# Patient Record
Sex: MALE | Race: WHITE | NOT HISPANIC OR LATINO | Employment: FULL TIME | URBAN - METROPOLITAN AREA
[De-identification: names, ages, dates, MRNs, and addresses within clinical notes are randomized per-mention and may not be internally consistent; named-entity substitution may affect disease eponyms.]

---

## 2017-03-16 ENCOUNTER — ALLSCRIPTS OFFICE VISIT (OUTPATIENT)
Dept: OTHER | Facility: OTHER | Age: 42
End: 2017-03-16

## 2017-08-05 ENCOUNTER — OFFICE VISIT (OUTPATIENT)
Dept: URGENT CARE | Facility: MEDICAL CENTER | Age: 42
End: 2017-08-05
Payer: COMMERCIAL

## 2017-08-05 PROCEDURE — 99213 OFFICE O/P EST LOW 20 MIN: CPT

## 2017-08-06 ENCOUNTER — OFFICE VISIT (OUTPATIENT)
Dept: URGENT CARE | Facility: MEDICAL CENTER | Age: 42
End: 2017-08-06
Payer: COMMERCIAL

## 2017-08-06 PROCEDURE — 99213 OFFICE O/P EST LOW 20 MIN: CPT

## 2018-01-10 ENCOUNTER — ALLSCRIPTS OFFICE VISIT (OUTPATIENT)
Dept: OTHER | Facility: OTHER | Age: 43
End: 2018-01-10
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
## 2018-01-13 VITALS
SYSTOLIC BLOOD PRESSURE: 110 MMHG | BODY MASS INDEX: 29.55 KG/M2 | HEART RATE: 74 BPM | DIASTOLIC BLOOD PRESSURE: 74 MMHG | HEIGHT: 73 IN | WEIGHT: 223 LBS

## 2018-01-13 NOTE — PSYCH
Psych Med Mgmt    Appearance: was calm and cooperative, adequate hygiene and grooming and good eye contact  Observed mood: mood appropriate  Observed mood: affect appropriate  Speech: a normal rate  Thought processes: coherent/organized  Hallucinations: no hallucinations present  Thought Content: no delusions  Abnormal Thoughts: The patient has no suicidal thoughts and no homicidal thoughts  Orientation: The patient is oriented to person, place and time  Recent and Remote Memory: short term memory intact and long term memory intact  Attention Span And Concentration: concentration intact  Insight: Insight intact  Judgment: His judgment was intact  Muscle Strength And Tone  Muscle strength and tone were normal  Normal gait and station  Language: no difficulty naming common objects  Fund of knowledge: Patient displays adequate knowledge of current events, adequate fund of knowledge regarding past history and adequate fund of knowledge regarding vocabulary  The patient is experiencing no localized pain  Treatment Recommendations: Continue Lamictal 400 mg at bedtime  Off Adderall XR  Risks, Benefits And Possible Side Effects Of Medications: Risks, benefits, and possible side effects of medications explained to patient and patient verbalizes understanding  He reports normal appetite, normal energy level, recent 5 lbs weight loss and normal number of sleep hours  Patient states he has been doing well since last visit  Mood has been stable, no significant depression  Anxiety is controlled  No suicidality or homicidality  No psychotic symptoms  No side effects from medications reported  No rash  Vitals  Signs [Data Includes: Current Encounter]   Recorded: 04Zvz8412 03:36PM   Heart Rate: 73  Systolic: 653  Diastolic: 77  BP Cuff Size: Large  Height: 6 ft 1 in  Weight: 235 lb   BMI Calculated: 31  BSA Calculated: 2 3    Assessment    1   Bipolar I disorder, most recent episode mixed, in full remission (296 66) (F31 78)   2  ADHD (attention deficit hyperactivity disorder), combined type (314 01) (F90 2)    Plan    1  Follow-up visit in 6 months Evaluation and Treatment  Follow-up  Status: Hold For -   Scheduling  Requested for: 14Apr2016    2  LamoTRIgine 200 MG Oral Tablet (LaMICtal); TAKE 2 TABLETS AT BEDTIME    Review of Systems    Constitutional: recent 5 lb weight loss  Cardiovascular: no complaints of slow or fast heart rate, no chest pain, no palpitations  Respiratory: no complaints of shortness of breath, no wheezing, no dyspnea on exertion  Gastrointestinal: no complaints of abdominal pain, no constipation, no nausea, no diarrhea, no vomiting  Genitourinary: no complaints of dysuria, no incontinence, no pelvic pain, no urinary frequency  Musculoskeletal: no complaints of arthralgia, no myalgias, no limb pain, no joint stiffness  Integumentary: no complaints of skin rash, no itching, no dry skin  Neurological: no complaints of headache, no confusion, no numbness, no dizziness  Past Psychiatric History    Past Psychiatric History: No history of past inpatient psychiatric admissions  No history of past suicide attempts  Substance Abuse Hx    Substance Abuse History: History of cocaine abuse years ago  Active Problems    1  ADHD (attention deficit hyperactivity disorder), combined type (314 01) (F90 2)   2  Bipolar I disorder, most recent episode mixed, in full remission (296 66) (F31 78)   3  Heart attack (410 90) (I21 3)   4  Hypertension (401 9) (I10)    Past Medical History    1  Denied: History of head injury   2  Denied: History of Seizures    The active problems and past medical history were reviewed and updated today  Allergies    1  No Known Drug Allergies    Current Meds   1  Amoxicillin-Pot Clavulanate 875-125 MG Oral Tablet; Therapy: 61ZYL4142 to (Last Kendra Engel)  Requested for: 48PBX3011 Ordered   2   Azithromycin 250 MG Oral Tablet; Therapy: 19XWV2402 to (Last Rx:29Oct2011)  Requested for: 29Oct2011 Ordered   3  Dexilant 60 MG Oral Capsule Delayed Release; Therapy: 40OYT7118 to (Last Rx:16Mar2011)  Requested for: 04QUG5841 Ordered   4  Diphenoxylate-Atropine 2 5-0 025 MG Oral Tablet; Therapy: 98TGP9405 to (Last Rx:01Jun2011)  Requested for: 26NIM8762 Ordered   5  Fluticasone Propionate 50 MCG/ACT Nasal Suspension; Therapy: 93KBJ1195 to (Last Rx:29Oct2011)  Requested for: 29Oct2011 Ordered   6  Ibuprofen 800 MG Oral Tablet; Therapy: 47SLC5358 to (Last Rx:69Cry3068)  Requested for: 32Omt5571 Ordered   7  LamoTRIgine 200 MG Oral Tablet; TAKE 2 TABLETS AT BEDTIME; Therapy: 96IAE4378 to (Evaluate:26Fmj3181)  Requested for: 62UZA3025; Last   Rx:14Oct2015 Ordered   8  LamoTRIgine 200 MG Oral Tablet; Therapy: 85UDL6190 to (Last Rx:04Oct2010)  Requested for: 18SRS5910; Status: ACTIVE   - Renewal Denied Ordered   9  LORazepam 1 MG Oral Tablet; Therapy: 58BZK5496 to (Last Rx:07Ltk0655)  Requested for: 44Ylf3135 Ordered   10  Nasonex 50 MCG/ACT Nasal Suspension; Therapy: 56PJF2195 to (Last Kali Sheets)  Requested for: 41GLM8372 Ordered   11  PEG 3350/Electrolytes 240 GM Oral Solution Reconstituted; Therapy: 70QDX1042 to (Last Rx:17Mar2011)  Requested for: 56LJM0884 Ordered   12  Promethazine-Codeine 6 25-10 MG/5ML Oral Syrup; Therapy: 08SBW8976 to (Last Rx:94Pik4427)  Requested for: 29Oct2011 Ordered   13  Strattera 80 MG Oral Capsule; Therapy: 10HDH1556 to (Last Rx:90Zzu5818)  Requested for: 04Oct2010 Ordered    The medication list was reviewed and updated today  Family Psych History    1  Family history of Anxiety    2  No family history of mental disorder    The family history was reviewed and updated today  Social History    · Never smoked   · Social alcohol use (Z78 9)   · History of Uses cocaine  The social history was reviewed and updated today  The social history was reviewed and is unchanged  , lives with wife  Has 3year old daughter  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 bachelor's degree in engineering  No history of legal problems  No  history  History Of Phys/Sex Abuse Or Perpetration    History Of Phys/Sex Abuse or Perpetration: No history of physical or sexual abuse  End of Encounter Meds    1  LamoTRIgine 200 MG Oral Tablet (LaMICtal); TAKE 2 TABLETS AT BEDTIME; Therapy: 18HIX5525 to (Evaluate:09Jan2017)  Requested for: 14Apr2016; Last   Rx:14Apr2016 Ordered    2  Amoxicillin-Pot Clavulanate 875-125 MG Oral Tablet; Therapy: 57SER4592 to (Last Lis Edilia)  Requested for: 78ZOH3626 Ordered   3  Azithromycin 250 MG Oral Tablet; Therapy: 49NQM2732 to (Last Rx:29Oct2011)  Requested for: 29Oct2011 Ordered   4  Dexilant 60 MG Oral Capsule Delayed Release; Therapy: 96GHI4966 to (Last Rx:16Mar2011)  Requested for: 01UPA1650 Ordered   5  Diphenoxylate-Atropine 2 5-0 025 MG Oral Tablet; Therapy: 14JYW2733 to (Last Rx:01Jun2011)  Requested for: 53UJO5423 Ordered   6  Fluticasone Propionate 50 MCG/ACT Nasal Suspension; Therapy: 89JUU0347 to (Last Rx:29Oct2011)  Requested for: 29Oct2011 Ordered   7  Ibuprofen 800 MG Oral Tablet; Therapy: 30CBZ4003 to (Last Rx:48Rta6626)  Requested for: 10Oxc0442 Ordered   8  LamoTRIgine 200 MG Oral Tablet; Therapy: 65VPP0500 to (Last Rx:04Oct2010)  Requested for: 66ZJL5285; Status: ACTIVE   - Renewal Denied Ordered   9  LORazepam 1 MG Oral Tablet; Therapy: 05RWA4735 to (Last Rx:54Jgy5757)  Requested for: 87Rbd4118 Ordered   10  Nasonex 50 MCG/ACT Nasal Suspension (Mometasone Furoate); Therapy: 39UEE1551 to (Last Lis Edilia)  Requested for: 19KWA6535 Ordered   11  PEG 3350/Electrolytes 240 GM Oral Solution Reconstituted; Therapy: 22ALI4352 to (Last Rx:17Mar2011)  Requested for: 77QTS4126 Ordered   12  Promethazine-Codeine 6 25-10 MG/5ML Oral Syrup;     Therapy: 16GXW5463 to (Last Rx:29Oct2011)  Requested for: 11MCE0727 Ordered   13  Strattera 80 MG Oral Capsule;     Therapy: 41ERR1880 to (Last Rx:04Oct2010)  Requested for: 62BAI3001 Ordered    Signatures   Electronically signed by : BISI Werner ; Apr 14 2016  3:41PM EST                       (Author)

## 2018-01-23 VITALS
BODY MASS INDEX: 32.07 KG/M2 | SYSTOLIC BLOOD PRESSURE: 130 MMHG | HEIGHT: 73 IN | WEIGHT: 242 LBS | DIASTOLIC BLOOD PRESSURE: 85 MMHG | HEART RATE: 82 BPM

## 2018-01-23 NOTE — PSYCH
Psych Med Mgmt    Appearance: was calm and cooperative, adequate hygiene and grooming and good eye contact  Observed mood: mood appropriate  Observed mood: affect appropriate  Speech: a normal rate and fluent  Thought processes: coherent/organized  Hallucinations: no hallucinations present  Thought Content: no delusions  Abnormal Thoughts: The patient has no suicidal thoughts and no homicidal thoughts  Orientation: The patient is oriented to person, place and time  Recent and Remote Memory: short term memory intact and long term memory intact  Attention Span And Concentration: concentration impaired  Insight: Insight intact  Judgment: His judgment was intact  Muscle Strength And Tone  Muscle strength and tone were normal  Normal gait and station  Language: no difficulty naming common objects, no difficulty repeating a phrase and no difficulty writing a sentence  Fund of knowledge: Patient displays adequate knowledge of current events, adequate fund of knowledge regarding past history and adequate fund of knowledge regarding vocabulary  The patient is experiencing moderate to severe pain  On a scale of 0 - 10 the pain severity is a 3  Treatment Recommendations: Continue Lamictal 400 mg at bedtime  Continue Klonopin 0 5 mg bid PRN for anxiety    Risks, Benefits And Possible Side Effects Of Medications: Risks, benefits, and possible side effects of medications explained to patient and patient verbalizes understanding  Discussed with patient the risks of sedation, respiratory depression, impairment of ability to drive and potential for abuse and addiction related to treatment with benzodiazepine medications  The patient understands risk of treatment with benzodiazepine medications, agrees to not drive if feels impaired and agrees to take medications as prescribed          The patient has been filling controlled prescriptions on time as prescribed to [place] [facility]       He reports normal appetite, normal energy level, recent 12 lbs weight gain  and normal number of sleep hours  Not seen since March 2017  Dengerson Garcias states he has been doing well since last visit  Mood has been stable, no significant depressive symptoms  Still has occasional anxiety symptoms - feels Klonopin helps with that (takes it few times a week)  No suicidal or homicidal ideation  No psychotic symptoms  Sleep is adequate  Appetite is good  No side effects from medications reported  No rash  Vitals  Signs   Recorded: 12RGG7586 04:23PM   Heart Rate: 82  Systolic: 898  Diastolic: 85  BP Cuff Size: Large  Height: 6 ft 1 in  Weight: 242 lb   BMI Calculated: 31 93  BSA Calculated: 2 33    Assessment    1  Bipolar I disorder, most recent episode depressed, in full remission (296 56) (F31 76)   2  CODEY (generalized anxiety disorder) (300 02) (F41 1)   3  ADHD (attention deficit hyperactivity disorder), combined type (314 01) (F90 2)    Plan    1  LamoTRIgine 200 MG Oral Tablet; TAKE 2 TABLETS AT BEDTIME    2  ClonazePAM 0 5 MG Oral Tablet; TAKE 1 TABLET TWICE DAILY AS NEEDED    Review of Systems    Constitutional: recent 12 lb weight gain  Cardiovascular: no complaints of slow or fast heart rate, no chest pain, no palpitations  Respiratory: no complaints of shortness of breath, no wheezing, no dyspnea on exertion  Gastrointestinal: no complaints of abdominal pain, no constipation, no nausea, no diarrhea, no vomiting  Genitourinary: no complaints of dysuria, no incontinence, no pelvic pain, no urinary frequency  Musculoskeletal: lower back pain  Integumentary: no complaints of skin rash, no itching, no dry skin  Neurological: no complaints of headache, no confusion, no numbness, no dizziness  Past Psychiatric History    Past Psychiatric History: No history of past inpatient psychiatric admissions  No history of past suicide attempts          Substance Abuse Hx    Substance Abuse History: History of cocaine abuse years ago  Active Problems    1  ADHD (attention deficit hyperactivity disorder), combined type (314 01) (F90 2)   2  Heart attack (410 90) (I21 9)   3  Hypertension (401 9) (I10)    Past Medical History    1  Denied: History of head injury   2  Denied: History of Seizures    The active problems and past medical history were reviewed and updated today  Allergies    1  Ancef SOLR   2  CefTRIAXone Sodium POWD    Current Meds   1  AmLODIPine Besylate 5 MG Oral Tablet; TAKE 1 TABLET DAILY; Therapy: 42OHY5752 to Recorded   2  Aspirin 81 MG Oral Tablet Delayed Release; TAKE 1 TABLET DAILY; Therapy: 53MMO1596 to Recorded   3  Atorvastatin Calcium 10 MG Oral Tablet; Therapy: (Ephraim Busch) to Recorded   4  ClonazePAM 0 5 MG Oral Tablet; TAKE 1 TABLET TWICE DAILY AS NEEDED; Therapy: 08HQY2574 to (Evaluate:09Jan2018); Last Rx:10Nov2017 Ordered   5  CoQ-10 100 MG Oral Capsule; TAKE 1 CAPSULE TWICE DAILY; Therapy: 47EYW2842 to Recorded   6  Effient 10 MG Oral Tablet; TAKE 1 TABLET DAILY; Therapy: 43HRB2022 to Recorded   7  LamoTRIgine 200 MG Oral Tablet; TAKE 2 TABLETS AT BEDTIME; Therapy: 10FVS0375 to (Evaluate:93Uvp1188)  Requested for: 24XQM2350; Last   Rx:14Nov2017 Ordered   8  Metoprolol Succinate ER 50 MG Oral Tablet Extended Release 24 Hour; TAKE 1 AND 1/2   TABLETS DAILY; Therapy: 32COF2322 to Recorded   9  Spironolactone 25 MG Oral Tablet; TAKE 1 TABLET DAILY; Therapy: 01ICH4010 to Recorded    The medication list was reviewed and updated today  Family Psych History  Mother    1  Family history of Anxiety  Father    2  No family history of mental disorder    The family history was reviewed and updated today  Social History    · Never smoked   · Social alcohol use (Z78 9)   · History of Uses cocaine  The social history was reviewed and updated today  , lives with wife  Has 11year old daughter   Works for [employer] in [email or web address]  Has bachelor's degree in engineering  No history of legal problems  No  history  History Of Phys/Sex Abuse Or Perpetration    History Of Phys/Sex Abuse or Perpetration: No history of physical or sexual abuse  End of Encounter Meds    1  LamoTRIgine 200 MG Oral Tablet; TAKE 2 TABLETS AT BEDTIME; Therapy: 69ACF7436 to (Heath Nam)  Requested for: 43KUW0563; Last   Rx:10Jan2018 Ordered    2  ClonazePAM 0 5 MG Oral Tablet; TAKE 1 TABLET TWICE DAILY AS NEEDED; Therapy: 97BPI1724 to (Evaluate:09Jun2018); Last Rx:10Jan2018 Ordered    3  AmLODIPine Besylate 5 MG Oral Tablet; TAKE 1 TABLET DAILY; Therapy: 94PVI0145 to Recorded   4  Aspirin 81 MG Oral Tablet Delayed Release; TAKE 1 TABLET DAILY; Therapy: 48SSJ9075 to Recorded   5  Atorvastatin Calcium 10 MG Oral Tablet; Therapy: (Cloduglas Rodolfo) to Recorded   6  CoQ-10 100 MG Oral Capsule; TAKE 1 CAPSULE TWICE DAILY; Therapy: 27CTL4918 to Recorded   7  Effient 10 MG Oral Tablet (Prasugrel HCl); TAKE 1 TABLET DAILY; Therapy: 69CZP2939 to Recorded   8  Metoprolol Succinate ER 50 MG Oral Tablet Extended Release 24 Hour; TAKE 1 AND 1/2   TABLETS DAILY; Therapy: 28DRI3148 to Recorded   9  Spironolactone 25 MG Oral Tablet; TAKE 1 TABLET DAILY;    Therapy: 93YEH3965 to Recorded    Signatures   Electronically signed by : BISI Kaba ; Wilmar 10 2018  4:41PM EST                       (Author)

## 2018-05-14 PROBLEM — F41.1 GAD (GENERALIZED ANXIETY DISORDER): Status: ACTIVE | Noted: 2018-01-10

## 2018-05-14 PROBLEM — F31.76 BIPOLAR I DISORDER, MOST RECENT EPISODE DEPRESSED, IN FULL REMISSION (HCC): Chronic | Status: ACTIVE | Noted: 2018-01-10

## 2018-05-14 PROBLEM — F41.1 GAD (GENERALIZED ANXIETY DISORDER): Chronic | Status: ACTIVE | Noted: 2018-01-10

## 2018-05-14 PROBLEM — F31.76 BIPOLAR I DISORDER, MOST RECENT EPISODE DEPRESSED, IN FULL REMISSION (HCC): Status: ACTIVE | Noted: 2018-01-10

## 2018-05-14 PROBLEM — I25.10 CHRONIC CORONARY ARTERY DISEASE: Status: ACTIVE | Noted: 2018-05-14

## 2018-05-14 RX ORDER — LAMOTRIGINE 200 MG/1
2 TABLET ORAL
COMMUNITY
Start: 2017-03-16 | End: 2018-05-17 | Stop reason: SDUPTHER

## 2018-05-14 RX ORDER — CLONAZEPAM 0.5 MG/1
1 TABLET ORAL 2 TIMES DAILY PRN
COMMUNITY
Start: 2017-03-16 | End: 2018-05-17 | Stop reason: SDUPTHER

## 2018-05-14 NOTE — PSYCH
MEDICATION MANAGEMENT NOTE        [address]      Name and Date of Birth:  Ramesh Cummings 36 y o  1975 MRN: [id number]    Date of Visit: May 17, 2018    SUBJECTIVE:    La Patel continues to do well since the last visit  He reports that mood has been stable, denies any significant depressive symptoms  He states that anxiety symptoms are more controlled, has been taking Klonopin only occasionally  He denies any suicidal ideation, intent or plan at present; denies any homicidal ideation, intent or plan at present  He has no auditory hallucinations, denies any visual hallucinations, no overt delusions noted  He denies any side effects from current psychiatric medications  No rash noted or reported  Dorathy Infield HPI ROS Appetite Changes and Sleep: normal sleep, normal appetite, recent weight gain (5 lbs), normal energy level    Review Of Systems:      Constitutional negative   ENT negative   Cardiovascular negative   Respiratory negative   Gastrointestinal negative   Genitourinary negative   Musculoskeletal negative   Integumentary negative   Neurological negative   Endocrine negative   Other Symptoms none       Past Psychiatric History:     Past Inpatient Psychiatric Treatment:   No history of past inpatient psychiatric admissions  Past Outpatient Psychiatric Treatment:    In outpatient treatment at [address] [place] [address] for many years    Past Suicide Attempts: no  Past Violent Behavior: no  Past Psychiatric Medication Trials: Lamictal, Klonopin, Strattera and Adderall XR    Traumatic History:     Abuse: no history of sexual abuse, no history of physical abuse  Other Traumatic Events: none     Past Medical History:    Past Medical History:   Diagnosis Date    Bacterial infection of knee joint ([address] )     CAD (coronary artery disease)     Heart attack ([facility] [address] )     Hyperlipidemia     Hypertension      Past Medical History Pertinent Negatives:   Diagnosis Date Noted    Head injury     Seizures ([facility] [address] )      Allergies   Allergen Reactions    Atorvastatin      Tolerates crestor    Cefazolin Rash    Ceftriaxone Rash       Substance Abuse History:    History   Alcohol Use    Yes     Comment: social alcohol use     History   Drug Use No     Comment: History of cocaine use years ago  No current recreational drug use       Social History:    Social History     Social History    Marital status: /Civil Union     Spouse name: N/A    Number of children: 1    Years of education: bachelor's degree     Occupational History    works in sales      Social History Main Topics    Smoking status: Never Smoker    Smokeless tobacco: Never Used    Alcohol use Yes      Comment: social alcohol use    Drug use: No      Comment: History of cocaine use years ago  No current recreational drug use    Sexual activity: Yes     Other Topics Concern    Not on file     Social History Narrative    Education: bachelor's degree in engineering    Learning Disabilities: ADHD history    Marital History:     Children: 1 daughter    Living Arrangement: lives in home with wife and daughter    Occupational History: works in sales for [employer] Relationships: good support system    Legal History: none     History: None       Family Psychiatric History:     Family History   Problem Relation Age of Onset    Anxiety disorder Mother        History Review:  The following portions of the patient's history were reviewed and updated as appropriate: allergies, current medications, past family history, past medical history, past social history, past surgical history and problem list          OBJECTIVE:     Vital signs in last 24 hours:    Vitals:    05/17/18 1608   BP: 121/78   Cuff Size: Large   Pulse: 70   Weight: 112 kg (247 lb)   Height: 6' 1" (1 854 m)       Mental Status Evaluation:    Appearance age appropriate, casually dressed   Behavior cooperative, calm   Speech normal rate, normal volume, normal pitch   Mood euthymic   Affect normal range and intensity, appropriate   Thought Processes organized, goal directed   Associations intact associations   Thought Content no overt delusions   Perceptual Disturbances: no auditory hallucinations, no visual hallucinations   Abnormal Thoughts  Risk Potential Suicidal ideation - None  Homicidal ideation - None  Potential for aggression - No   Orientation oriented to person, place, time/date and situation   Memory recent and remote memory grossly intact   Consciousness alert and awake   Attention Span Concentration Span decreased attention span  decreased concentration   Intellect appears to be of average intelligence   Insight intact   Judgement intact   Muscle Strength and  Gait muscle strength and tone were normal, normal gait , normal balance   Motor activity no abnormal movements   Language no difficulty naming common objects, no difficulty repeating a phrase, no difficulty writing a sentence   Fund of Knowledge adequate knowledge of current events  adequate fund of knowledge regarding past history  adequate fund of knowledge regarding vocabulary    Pain none   Pain Scale 0       Laboratory Results: I have personally reviewed all pertinent laboratory/tests results  Recent Labs (last 2 months):   No visits with results within 2 Month(s) from this visit     Latest known visit with results is:   Conversion Encounter Outpatient on 05/17/2015   Component Date Value    Sodium 05/17/2015 141     Potassium 05/17/2015 3 4*    Chloride 05/17/2015 105     CO2 05/17/2015 27     Anion Gap 05/17/2015 9     Glucose 05/17/2015 93     BUN 05/17/2015 21     Calcium 05/17/2015 8 5     Creatinine 05/17/2015 1 14     AAGFR 05/17/2015 >60     NAAGFR 05/17/2015 >60     WBC 05/17/2015 14 98*    RBC 05/17/2015 5 28     Hemoglobin 05/17/2015 15 3     Hematocrit 05/17/2015 44 5     MCV 05/17/2015 84     MCH 05/17/2015 29 0     [facility] 05/17/2015 34 4     RDW 05/17/2015 13 3     Platelets 33/68/8868 264     Neutrophils Relative 05/17/2015 65     Lymphocytes Relative 05/17/2015 25     Monocytes Relative 05/17/2015 9     Eosinophils Relative 05/17/2015 1     Neutrophils Absolute 05/17/2015 9 74*    Lymphocytes Absolute 05/17/2015 3 75     Monocytes Absolute 05/17/2015 1 35*    Eosinophils Absolute 05/17/2015 0 15     Basophils Absolute 05/17/2015 0 04     MPV 05/17/2015 9 5        Assessment/Plan:       Diagnoses and all orders for this visit:    Bipolar I disorder, most recent episode depressed, in full remission (HCC)  -     lamoTRIgine (LaMICtal) 200 MG tablet; Take 2 tablets (400 mg total) by mouth daily at bedtime for 270 days    CODEY (generalized anxiety disorder)  -     clonazePAM (KlonoPIN) 0 5 mg tablet; Take 1 tablet (0 5 mg total) by mouth 2 (two) times a day as needed for anxiety for up to 180 days To be filled on or after 6/9/18    ADHD (attention deficit hyperactivity disorder), combined type    Other orders  -     Discontinue: clonazePAM (KlonoPIN) 0 5 mg tablet; Take 1 tablet by mouth 2 (two) times a day as needed  -     Discontinue: lamoTRIgine (LaMICtal) 200 MG tablet; Take 2 tablets by mouth daily at bedtime  -     aspirin 81 MG tablet; Take 1 tablet by mouth daily  -     Coenzyme Q10 (COQ-10) 100 MG CAPS; Take 1 capsule by mouth 2 (two) times a day  -     prasugrel (EFFIENT) tablet; Take 1 tablet by mouth daily  -     metoprolol succinate (TOPROL-XL) 50 mg 24 hr tablet; Take 1 5 tablets by mouth daily  -     lisinopril (ZESTRIL) 5 mg tablet; Take 5 mg by mouth  -     nitroglycerin (NITROSTAT) 0 4 mg SL tablet; Place 0 4 mg under the tongue  -     rosuvastatin (CRESTOR) 10 MG tablet; Take 10 mg by mouth  -     traMADol (ULTRAM) 50 mg tablet;  Take 50 mg by mouth every 8 (eight) hours  -     TURMERIC PO; Take by mouth        Treatment Recommendations/Precautions:    Continue Lamictal 400 mg at bedtime to help with mood stabilization  Continue Klonopin 0 4 mg bid PRN to improve anxiety symptoms  Medication management every 4 months    Risks/Benefits      Risks, Benefits And Possible Side Effects Of Medications:    Risks, benefits, and possible side effects of medications explained to Isrrael including risk of rash related to treatment with Lamictal and risks of dependence, sedation and respiratory depression related to treatment with benzodiazepine medications  He verbalizes understanding and agreement for treatment  Controlled Medication Discussion:     Isrrael has been filling controlled prescriptions on time as prescribed according to [facility]    Discussed with Yepiter the risks of sedation, respiratory depression, impairment of ability to drive and potential for abuse and addiction related to treatment with benzodiazepine medications  He understands risk of treatment with benzodiazepine medications, agrees to not drive if feels impaired and agrees to take medications as prescribed  Discussed with Kalli Almazan Box warning on concurrent use of benzodiazepines and opioid medications including sedation, respiratory depression, coma and death  He understands the risk of treatment with benzodiazepines in addition to opioids and wants to continue taking those medications      Psychotherapy Provided:     Individual psychotherapy provided: No

## 2018-05-17 ENCOUNTER — OFFICE VISIT (OUTPATIENT)
Dept: PSYCHIATRY | Facility: CLINIC | Age: 43
End: 2018-05-17
Payer: COMMERCIAL

## 2018-05-17 VITALS
HEIGHT: 73 IN | SYSTOLIC BLOOD PRESSURE: 121 MMHG | WEIGHT: 247 LBS | HEART RATE: 70 BPM | DIASTOLIC BLOOD PRESSURE: 78 MMHG | BODY MASS INDEX: 32.74 KG/M2

## 2018-05-17 DIAGNOSIS — F41.1 GAD (GENERALIZED ANXIETY DISORDER): Chronic | ICD-10-CM

## 2018-05-17 DIAGNOSIS — F31.76 BIPOLAR I DISORDER, MOST RECENT EPISODE DEPRESSED, IN FULL REMISSION (HCC): Primary | Chronic | ICD-10-CM

## 2018-05-17 DIAGNOSIS — F90.2 ADHD (ATTENTION DEFICIT HYPERACTIVITY DISORDER), COMBINED TYPE: Chronic | ICD-10-CM

## 2018-05-17 PROBLEM — E78.2 MIXED HYPERLIPIDEMIA: Status: ACTIVE | Noted: 2017-11-20

## 2018-05-17 PROCEDURE — 99213 OFFICE O/P EST LOW 20 MIN: CPT | Performed by: PSYCHIATRY & NEUROLOGY

## 2018-05-17 RX ORDER — NITROGLYCERIN 0.4 MG/1
0.4 TABLET SUBLINGUAL
COMMUNITY

## 2018-05-17 RX ORDER — PRASUGREL 10 MG/1
1 TABLET, FILM COATED ORAL DAILY
COMMUNITY
Start: 2017-03-16

## 2018-05-17 RX ORDER — CLONAZEPAM 0.5 MG/1
0.5 TABLET ORAL 2 TIMES DAILY PRN
Qty: 180 TABLET | Refills: 1 | Status: SHIPPED | OUTPATIENT
Start: 2018-06-09 | End: 2018-06-29 | Stop reason: SDUPTHER

## 2018-05-17 RX ORDER — ROSUVASTATIN CALCIUM 10 MG/1
10 TABLET, COATED ORAL
COMMUNITY

## 2018-05-17 RX ORDER — METOPROLOL SUCCINATE 50 MG/1
1.5 TABLET, EXTENDED RELEASE ORAL DAILY
COMMUNITY
Start: 2017-03-16

## 2018-05-17 RX ORDER — LAMOTRIGINE 200 MG/1
400 TABLET ORAL
Qty: 180 TABLET | Refills: 2 | Status: SHIPPED | OUTPATIENT
Start: 2018-05-17 | End: 2019-02-12 | Stop reason: SDUPTHER

## 2018-05-17 RX ORDER — TRAMADOL HYDROCHLORIDE 50 MG/1
50 TABLET ORAL EVERY 8 HOURS
COMMUNITY
Start: 2018-03-28 | End: 2019-03-20 | Stop reason: ALTCHOICE

## 2018-05-17 RX ORDER — LISINOPRIL 5 MG/1
5 TABLET ORAL
COMMUNITY

## 2018-05-17 NOTE — PSYCH
TREATMENT PLAN (Medication Management Only)        [facility]    Name/Date of Birth/MRN:  Valerie Chacko 36 y o  1975 MRN: [id number]  Date of Treatment Plan: May 17, 2018  Diagnosis/Diagnoses:   1  Bipolar I disorder, most recent episode depressed, in full remission ([facility] [address] )    2  CODEY (generalized anxiety disorder)    3  ADHD (attention deficit hyperactivity disorder), combined type      Strengths/Personal Resources for Self-Care: "managing stress through work and activities", taking medications as prescribed  Area/Areas of need (in own words): "continue to manage stress and anxiety"  1  Long Term Goal: maintain mood stability  Target Date: 4 months - 9/17/2018  Person/Persons responsible for completion of goal: Trina Lenz  2  Short Term Objective (s) - How will we reach this goal?:   A  Provider new recommended medication/dosage changes and/or continue medication(s): continue current medications as prescribed (Lamictal and Klonopin)  B   N/A   C   N/A  Target Date: 4 months - 9/17/2018  Person/Persons Responsible for Completion of Goal: Trina Lenz   Progress Towards Goals: stable  Treatment Modality: medication management every 4 months  Review due 90 to 120 days from date of this plan: 4 months - 9/17/2018  Expected length of service: maintenance  My Physician/PA/NP and I have developed this plan together and I agree to work on the goals and objectives  I understand the treatment goals that were developed for my treatment    Signature:       Date and time:  Signature of parent/guardian if under age of 15 years: Date and time:  Signature of provider:      Date and time:  Signature of Supervising Physician:    Date and time: 5/17/2018      Dennis Carrera MD

## 2018-06-29 ENCOUNTER — TELEPHONE (OUTPATIENT)
Dept: PSYCHIATRY | Facility: CLINIC | Age: 43
End: 2018-06-29

## 2018-06-29 ENCOUNTER — OFFICE VISIT (OUTPATIENT)
Dept: URGENT CARE | Facility: MEDICAL CENTER | Age: 43
End: 2018-06-29
Payer: COMMERCIAL

## 2018-06-29 VITALS — WEIGHT: 240 LBS | BODY MASS INDEX: 31.66 KG/M2

## 2018-06-29 DIAGNOSIS — F41.1 GAD (GENERALIZED ANXIETY DISORDER): Primary | Chronic | ICD-10-CM

## 2018-06-29 DIAGNOSIS — T26.61XA CHEMICAL BURN DUE TO ACID, CONJUNCTIVA, RIGHT: Primary | ICD-10-CM

## 2018-06-29 DIAGNOSIS — T54.2X1A CHEMICAL BURN DUE TO ACID, CONJUNCTIVA, RIGHT: Primary | ICD-10-CM

## 2018-06-29 PROCEDURE — 99213 OFFICE O/P EST LOW 20 MIN: CPT

## 2018-06-29 RX ORDER — CLONAZEPAM 1 MG/1
0.5 TABLET ORAL 2 TIMES DAILY PRN
Qty: 90 TABLET | Refills: 1 | Status: SHIPPED | OUTPATIENT
Start: 2018-06-29 | End: 2018-10-05 | Stop reason: SDUPTHER

## 2018-06-29 RX ORDER — ERYTHROMYCIN 5 MG/G
0.5 OINTMENT OPHTHALMIC
Qty: 3.5 G | Refills: 0 | Status: SHIPPED | OUTPATIENT
Start: 2018-06-29 | End: 2018-07-06

## 2018-06-29 NOTE — PATIENT INSTRUCTIONS
No corneal abrasion on Fluorescein stain  Use erythromycin ointment as directed  Follow up with Dr Nima Linder if any complications  [phone]  Chemical Eye [place]   WHAT YOU NEED TO KNOW:   A chemical eye burn is an injury to any part of your eye that is exposed to chemicals  DISCHARGE INSTRUCTIONS:   Medicines:   · Antibiotic medicine: This medicine helps prevent infection caused by bacteria  It may be given as an eyedrop or ointment  · Pain medicine: You may be given medicine to take away or decrease pain  Do not wait until the pain is severe before you take your medicine  This medicine may be given as an eyedrop or pill  · Cycloplegic medicine: This medicine dilates your pupil and relaxes your eye muscles to help decrease pain and twitching  · Steroids: This medicine may be given to decrease inflammation  · Take your medicine as directed  Contact your healthcare provider if you think your medicine is not helping or if you have side effects  Tell him of her if you are allergic to any medicine  Keep a list of the medicines, vitamins, and herbs you take  Include the amounts, and when and why you take them  Bring the list or the pill bottles to follow-up visits  Carry your medicine list with you in case of an emergency  Follow up with your healthcare provider or ophthalmologist as directed: You will need to return to have your eye and vision checked  Write down your questions so you remember to ask them during your visits  Eye care: Your healthcare provider or ophthalmologist may give you artificial tears or an eye patch to protect your eye and help it heal    If you get chemicals in your eye:  Rinse your eye immediately  The sooner you begin to rinse your eye, the better your chances of healing  · Rinse your eye with a steady stream of water for at least 30 minutes   Use the cleanest water you can get to quickly  Never use other chemicals to rinse out your eye   Move your eyeball in all directions to make sure that all parts of your eye are rinsed  If possible, continue to rinse out your eye with water until you reach the treatment center  · Remove clothing that may still contain chemicals  Do not take out your contact lenses  · Bring the container to show healthcare providers, if possible  Do not bring the container if the chemical may burn you again  Prevent another chemical eye burn:   · Always wear protective eyewear, such as goggles, that fit closely around your eyes  · Do not touch your eyes when you work with chemicals  · Follow the instructions on the container when you use chemicals that may hurt your eyes  · Make a plan in case you or someone else gets burned  Know where the best water or liquid is located for rinsing your eyes  Check to see if your company has an eye wash station  Contact your healthcare provider or eye specialist if:   · Your eye feels dry  · Your eye is watery  · You have questions or concerns about your condition or care  Return to the emergency department if:   · Your eyesight is blurry or you lose vision  · You have cuts, bumps, or other damage on your eyeball  · Your eye becomes red or cloudy  © 2017 [facility] Information is for End User's use only and may not be sold, redistributed or otherwise used for commercial purposes  All illustrations and images included in CareNotes® are the copyrighted property of [employer]  or [employer]  The above information is an  only  It is not intended as medical advice for individual conditions or treatments  Talk to your doctor, nurse or pharmacist before following any medical regimen to see if it is safe and effective for you

## 2018-06-29 NOTE — TELEPHONE ENCOUNTER
Escripted to [facility] for Klonopin 1 mg to take 1/2 tablet 2 times a day as needed, for 90 days with 1 RF

## 2018-06-29 NOTE — TELEPHONE ENCOUNTER
Pt called to say that when he went to fill Clonazepam 0 5 prescription the pharmacy said the Clonazepam 0 5 was on back order  They were wondering if they could substitute Clonazepam 1 mg and have patient break it in half  Can that be done?
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
## 2018-10-01 NOTE — PSYCH
MEDICATION MANAGEMENT NOTE        [facility]      Name and Date of Birth:  Fitz Espana 39 y o  1975 MRN: [id number]    Date of Visit: October 5, 2018    SUBJECTIVE:    Steven Sites states that he continues to do well since the last visit  He reports that mood remains stable, denies any significant depressive symptoms  He states that anxiety symptoms are controlled well  Has been going to the gym regularly, follows with cardiologist and PCP for medical issues  He denies any suicidal ideation, intent or plan at present; denies any homicidal ideation, intent or plan at present  He has no auditory hallucinations, denies any visual hallucinations, has no overt delusions  He denies any side effects from current psychiatric medications  No rash noted or reported  Leslie Porter HPI ROS Appetite Changes and Sleep: normal sleep, normal appetite, recent weight loss (5 lbs), normal energy level    Review Of Systems:      Constitutional recent weight loss (5 lbs)   ENT negative   Cardiovascular negative   Respiratory negative   Gastrointestinal negative   Genitourinary negative   Musculoskeletal back pain   Integumentary negative   Neurological negative   Endocrine negative   Other Symptoms none       Past Psychiatric History:      Past Inpatient Psychiatric Treatment:   No history of past inpatient psychiatric admissions  Past Outpatient Psychiatric Treatment:    In outpatient treatment at [address] [place] [address] for many years    Past Suicide Attempts: no  Past Violent Behavior: no  Past Psychiatric Medication Trials: Lamictal, Klonopin, Strattera and Adderall XR     Traumatic History:      Abuse: no history of sexual abuse, no history of physical abuse  Other Traumatic Events: none     Past Medical History:    Past Medical History:   Diagnosis Date    Bacterial infection [facility] knee joint [facility] )     CAD (coronary artery disease)     Heart attack ([facility] [address] )     Hyperlipidemia     Hypertension      Past Medical History Pertinent Negatives:   Diagnosis Date Noted    Head injury     Seizures ([address] )      Allergies   Allergen Reactions    Atorvastatin      Tolerates crestor    Cefazolin Rash    Ceftriaxone Rash       Substance Abuse History:    History   Alcohol Use    Yes     Comment: social alcohol use     History   Drug Use No     Comment: History of cocaine use years ago  No current recreational drug use       Social History:    Social History     Social History    Marital status: /Civil Union     Spouse name: N/A    Number of children: 1    Years of education: bachelor's degree     Occupational History    works in sales      Social History Main Topics    Smoking status: Never Smoker    Smokeless tobacco: Never Used    Alcohol use Yes      Comment: social alcohol use    Drug use: No      Comment: History of cocaine use years ago  No current recreational drug use    Sexual activity: Yes     Partners: Female     Other Topics Concern    Not on file     Social History Narrative    Education: bachelor's degree in engineering    Learning Disabilities: ADHD history    Marital History:     Children: 1 daughter    Living Arrangement: lives in home with wife and daughter    Occupational History: works in sales for [employer] Relationships: good support system    Legal History: none     History: None       Family Psychiatric History:     Family History   Problem Relation Age of Onset    Anxiety disorder Mother        History Review:  The following portions of the patient's history were reviewed and updated as appropriate: allergies, current medications, past family history, past medical history, past social history, past surgical history and problem list          OBJECTIVE:     Vital signs in last 24 hours:    Vitals:    10/05/18 1532   BP: 121/78   Pulse: 78   Weight: 110 kg (242 lb)   Height: 6' 1" (1 854 m)       Mental Status Evaluation:    Appearance age appropriate, casually dressed   Behavior cooperative, calm   Speech normal rate, normal volume, normal pitch   Mood euthymic   Affect normal range and intensity, appropriate   Thought Processes organized, goal directed   Associations intact associations   Thought Content no overt delusions   Perceptual Disturbances: no auditory hallucinations, no visual hallucinations   Abnormal Thoughts  Risk Potential Suicidal ideation - None  Homicidal ideation - None  Potential for aggression - No   Orientation oriented to person, place, time/date and situation   Memory recent and remote memory grossly intact   Consciousness alert and awake   Attention Span Concentration Span attention span and concentration appear shorter than expected for age   Intellect appears to be of average intelligence   Insight intact   Judgement intact   Muscle Strength and  Gait muscle strength and tone were normal, normal gait , normal balance   Motor activity no abnormal movements   Language no difficulty naming common objects, no difficulty repeating a phrase, no difficulty writing a sentence   Fund of Knowledge adequate knowledge of current events  adequate fund of knowledge regarding past history  adequate fund of knowledge regarding vocabulary    Pain moderate   Pain Scale 5       Laboratory Results: I have personally reviewed all pertinent laboratory/tests results  Recent Labs (last 4 months):   No visits with results within 4 Month(s) from this visit  Latest known visit with results is:   No results found for any previous visit  Assessment/Plan:       Diagnoses and all orders for this visit:    Bipolar I disorder, most recent episode depressed, in full remission ([facility] [address] )    CODEY (generalized anxiety disorder)  -     clonazePAM (KlonoPIN) 1 mg tablet;  Take 0 5 tablets (0 5 mg total) by mouth 2 (two) times a day as needed for anxiety for up to 180 days To be filled on or after 12/26/18    ADHD (attention deficit hyperactivity disorder), combined type        Treatment Recommendations/Precautions:    Continue Lamictal 400 mg at bedtime to help with mood stabilization  Continue Klonopin 0 5 mg bid PRN to improve anxiety symptoms  Medication management every 4 months    Risks/Benefits      Risks, Benefits And Possible Side Effects Of Medications:    Risks, benefits, and possible side effects of medications explained to Isrrael including risk of rash related to treatment with Lamictal and risks of dependence, sedation and respiratory depression related to treatment with benzodiazepine medications  He verbalizes understanding and agreement for treatment  Controlled Medication Discussion:     Isrrael has been filling controlled prescriptions on time as prescribed according to [facility]    Discussed with Isrrael the risks of sedation, respiratory depression, impairment of ability to drive and potential for abuse and addiction related to treatment with benzodiazepine medications  He understands risk of treatment with benzodiazepine medications, agrees to not drive if feels impaired and agrees to take medications as prescribed  Discussed with [employer] warning on concurrent use of benzodiazepines and opioid medications including sedation, respiratory depression, coma and death  He understands the risk of treatment with benzodiazepines in addition to opioids and wants to continue taking those medications  Psychotherapy Provided:     Individual psychotherapy provided: Yes  Counseling was provided during the session today for 20 minutes  Medications, treatment progress and treatment plan reviewed with Isrrael  Goals discussed during in session: maintain control of anxiety and maintain mood stability  Discussed with Isrrael coping with stress at work  Coping strategies including exercising and spending time with family reviewed with Isrrael  Supportive therapy provided  Treatment Plan;    Completed and signed during the session: Yes - with Elvira Quarles MD 10/05/18

## 2018-10-03 ENCOUNTER — DOCUMENTATION (OUTPATIENT)
Dept: PSYCHIATRY | Facility: CLINIC | Age: 43
End: 2018-10-03

## 2018-10-03 NOTE — PROGRESS NOTES
Treatment Plan not completed within required time limits due to :   Follow up appointment scheduled over the 120 days from OV 5/17/2018

## 2018-10-05 ENCOUNTER — OFFICE VISIT (OUTPATIENT)
Dept: PSYCHIATRY | Facility: CLINIC | Age: 43
End: 2018-10-05

## 2018-10-05 VITALS
HEIGHT: 73 IN | WEIGHT: 242 LBS | DIASTOLIC BLOOD PRESSURE: 78 MMHG | SYSTOLIC BLOOD PRESSURE: 121 MMHG | BODY MASS INDEX: 32.07 KG/M2 | HEART RATE: 78 BPM

## 2018-10-05 DIAGNOSIS — F90.2 ADHD (ATTENTION DEFICIT HYPERACTIVITY DISORDER), COMBINED TYPE: Chronic | ICD-10-CM

## 2018-10-05 DIAGNOSIS — F41.1 GAD (GENERALIZED ANXIETY DISORDER): Chronic | ICD-10-CM

## 2018-10-05 DIAGNOSIS — F31.76 BIPOLAR I DISORDER, MOST RECENT EPISODE DEPRESSED, IN FULL REMISSION (HCC): Primary | Chronic | ICD-10-CM

## 2018-10-05 PROCEDURE — 99213 OFFICE O/P EST LOW 20 MIN: CPT | Performed by: PSYCHIATRY & NEUROLOGY

## 2018-10-05 PROCEDURE — 90833 PSYTX W PT W E/M 30 MIN: CPT | Performed by: PSYCHIATRY & NEUROLOGY

## 2018-10-05 RX ORDER — CLONAZEPAM 1 MG/1
0.5 TABLET ORAL 2 TIMES DAILY PRN
Qty: 90 TABLET | Refills: 1 | Status: SHIPPED | OUTPATIENT
Start: 2018-12-26 | End: 2018-10-30 | Stop reason: SDUPTHER

## 2018-10-05 NOTE — PSYCH
TREATMENT PLAN (Medication Management Only)        [facility]    Name/Date of Birth/MRN:  Fitz Espana 39 y o  1975 MRN: [id number]  Date of Treatment Plan: October 5, 2018  Diagnosis/Diagnoses:   1  Bipolar I disorder, most recent episode depressed, in full remission ([facility] [address] )    2  CODEY (generalized anxiety disorder)    3  ADHD (attention deficit hyperactivity disorder), combined type      Strengths/Personal Resources for Self-Care: "family"  Area/Areas of need (in own words): "stress management"  1  Long Term Goal: improve control of anxiety  Target Date: 4 months - 2/5/2019  Person/Persons responsible for completion of goal: [facility]  2  Short Term Objective (s) - How will we reach this goal?:   A  Provider new recommended medication/dosage changes and/or continue medication(s): continue current medications as prescribed (Lamictal and Klonopin)  B   N/A   C   N/A  Target Date: 4 months - 2/5/2019  Person/Persons Responsible for Completion of Goal: [facility]   Progress Towards Goals: stable  Treatment Modality: medication management every 4 months  Review due 90 to 120 days from date of this plan: 4 months - 2/5/2019  Expected length of service: maintenance  My Physician/PA/NP and I have developed this plan together and I agree to work on the goals and objectives  I understand the treatment goals that were developed for my treatment    Signature:       Date and time:  Signature of parent/guardian if under age of 15 years: Date and time:  Signature of provider:      Date and time:  Signature of Supervising Physician:    Date and time: 10/5/2018      Waleska Williamson MD

## 2018-10-30 ENCOUNTER — OFFICE VISIT (OUTPATIENT)
Dept: URGENT CARE | Facility: MEDICAL CENTER | Age: 43
End: 2018-10-30
Payer: COMMERCIAL

## 2018-10-30 VITALS
SYSTOLIC BLOOD PRESSURE: 104 MMHG | DIASTOLIC BLOOD PRESSURE: 80 MMHG | RESPIRATION RATE: 20 BRPM | HEIGHT: 73 IN | OXYGEN SATURATION: 100 % | TEMPERATURE: 97 F | WEIGHT: 250.8 LBS | HEART RATE: 88 BPM | BODY MASS INDEX: 33.24 KG/M2

## 2018-10-30 DIAGNOSIS — S01.512A LACERATION OF TONGUE, INITIAL ENCOUNTER: Primary | ICD-10-CM

## 2018-10-30 PROCEDURE — 99213 OFFICE O/P EST LOW 20 MIN: CPT | Performed by: PHYSICIAN ASSISTANT

## 2018-10-30 RX ORDER — ROSUVASTATIN CALCIUM 20 MG/1
20 TABLET, COATED ORAL EVERY EVENING
Refills: 3 | COMMUNITY
Start: 2018-08-12 | End: 2018-10-30 | Stop reason: SDUPTHER

## 2018-10-30 RX ORDER — CLONAZEPAM 0.5 MG/1
0.5 TABLET, ORALLY DISINTEGRATING ORAL 2 TIMES DAILY PRN
Refills: 4 | COMMUNITY
Start: 2018-08-16 | End: 2019-03-20 | Stop reason: SDUPTHER

## 2018-10-30 RX ORDER — ONDANSETRON 4 MG/1
4 TABLET, FILM COATED ORAL EVERY 8 HOURS PRN
Qty: 20 TABLET | Refills: 0 | Status: SHIPPED | OUTPATIENT
Start: 2018-10-30 | End: 2019-03-20 | Stop reason: ALTCHOICE

## 2018-10-31 NOTE — PROGRESS NOTES
[facility]      NAME: Madelaine Devlin is a 39 y o  male  : 1975    MRN: [id number]  DATE: 2018  TIME: 8:23 PM    Assessment and Plan   Laceration of tongue, initial encounter [S01 512A]  1  Laceration of tongue, initial encounter  ondansetron (ZOFRAN) 4 mg tablet       Patient Instructions     Tongue laceration that does not require closed  Patient does take prasugrel so that would prolong bleeding hypothetically  Advised to continue direct pressure  I did give a prescription for Zofran, to amount of blood the patient swallowed, can cause nausea  Strict return to the emergency department structures were given to patient  He expressed understanding    Chief Complaint     Chief Complaint   Patient presents with    Facial Laceration     Pt bite his tongue  Area currently bleeding  History of Present Illness   Madelaine Devlin presents to the clinic c/o      19-year-old male, presents for evaluation of tongue lacerations current approximately 5:00 p m  He was trying to eat a burger patient does prasugrel  After an myocardial infarction  He states that the bleeding has gone down since initially having  He is concerned because he also swallowed a lot of blood  He is stating that is the having some abdominal pain  He denies any fevers, chills, shortness of breath difficulty breathing  Review of Systems   Review of Systems   HENT: Positive for mouth sores  Respiratory: Negative  Cardiovascular: Negative            Current Medications     Long-Term Prescriptions   Medication Sig Dispense Refill    aspirin 81 MG tablet Take 1 tablet by mouth daily      lamoTRIgine (LaMICtal) 200 MG tablet Take 2 tablets (400 mg total) by mouth daily at bedtime for 270 days 180 tablet 2    lisinopril (ZESTRIL) 5 mg tablet Take 5 mg by mouth      metoprolol succinate (TOPROL-XL) 50 mg 24 hr tablet Take 1 5 tablets by mouth daily      nitroglycerin (NITROSTAT) 0 4 mg SL tablet Place 0 4 mg under the tongue      prasugrel (EFFIENT) tablet Take 1 tablet by mouth daily      rosuvastatin (CRESTOR) 10 MG tablet Take 10 mg by mouth      ondansetron (ZOFRAN) 4 mg tablet Take 1 tablet (4 mg total) by mouth every 8 (eight) hours as needed for nausea or vomiting 20 tablet 0    [DISCONTINUED] rosuvastatin (CRESTOR) 20 MG tablet Take 20 mg by mouth every evening  3       Current Allergies     Allergies as of 10/30/2018 - Reviewed 10/30/2018   Allergen Reaction Noted    Atorvastatin  01/24/2017    Cefazolin Rash 07/01/2015    Ceftriaxone Rash 07/01/2015            The following portions of the patient's history were reviewed and updated as appropriate: allergies, current medications, past family history, past medical history, past social history, past surgical history and problem list     HISTORICAL INFO:  Past Medical History:   Diagnosis Date    B[facility] infection [facility] joint [facility] [address] [facility]     CAD (coronary artery disease)     Heart attack ([facility] )     Hyperlipidemia     Hypertension      Past Surgical History:   Procedure Laterality Date    APPENDECTOMY      CORONARY ANGIOPLASTY WITH STENT PLACEMENT      KNEE ARTHROTOMY      SEPTOPLASTY      SHOULDER SURGERY         Objective   /80   Pulse 88   Temp (!) 97 °F (36 1 °C) (Temporal)   Resp 20   Ht 6' 1" (1 854 m)   Wt 114 kg (250 lb 12 8 oz)   SpO2 100%   BMI 33 09 kg/m²        Physical Exam     Physical Exam   Constitutional: He appears well-developed and well-nourished  No distress  HENT:   Head: Normocephalic and atraumatic  Mouth/Throat:        Visible wound, with flap of tongue tissue posteriorly  Wound is less than 1 cm in length  Not extend past the superficial layer of the tongue  Cardiovascular: Normal rate, regular rhythm and normal heart sounds  Pulmonary/Chest: Effort normal and breath sounds normal  No respiratory distress  He has no wheezes  He has no rales  Skin: Skin is warm and dry  He is not diaphoretic  Nursing note and vitals reviewed  M*Modal software was used to dictate this note  It may contain errors with dictating incorrect words/spelling  Please contact provider directly for any questions

## 2019-02-06 ENCOUNTER — DOCUMENTATION (OUTPATIENT)
Dept: PSYCHIATRY | Facility: CLINIC | Age: 44
End: 2019-02-06

## 2019-02-06 NOTE — PROGRESS NOTES
Treatment Plan not completed within required time limits due to :   Follow up appointment scheduled over the 120 days from OV 10/5/2018

## 2019-02-07 ENCOUNTER — DOCUMENTATION (OUTPATIENT)
Dept: PSYCHIATRY | Facility: CLINIC | Age: 44
End: 2019-02-07

## 2019-02-12 DIAGNOSIS — F31.76 BIPOLAR I DISORDER, MOST RECENT EPISODE DEPRESSED, IN FULL REMISSION (HCC): Primary | Chronic | ICD-10-CM

## 2019-02-12 RX ORDER — LAMOTRIGINE 200 MG/1
400 TABLET ORAL
Qty: 180 TABLET | Refills: 0 | Status: SHIPPED | OUTPATIENT
Start: 2019-02-12 | End: 2019-03-20 | Stop reason: SDUPTHER

## 2019-03-15 NOTE — PSYCH
MEDICATION MANAGEMENT NOTE        [address] [facility]      Name and Date of Birth:  Alex Qureshi 39 y o  1975 MRN: [id number]    Date of Visit: March 20, 2019    SUBJECTIVE:    Milka Mcgowan is seen today for a follow up for bipolar disorder, generalized anxiety disorder and ADHD  He continues to do well since the last visit  He states that mood has been stable, denies any significant depressive symptoms  He continues to experience on and off anxiety symptoms - feels PRN Klonopin helps with those symptoms  He has been exercising regularly, is doing well at work  Stable relationship with wife  He denies any suicidal ideation, intent or plan at present; denies any homicidal ideation, intent or plan at present  He has no auditory hallucinations, denies any visual hallucinations, has no delusional thinking  He denies any side effects from current psychiatric medications  No rash noted or reported  Lajean Cassette HPI ROS Appetite Changes and Sleep:     He reports normal sleep, normal appetite, recent weight gain (10 lbs), normal energy level    Review Of Systems:      Constitutional recent weight gain (10 lbs)   ENT negative   Cardiovascular negative   Respiratory negative   Gastrointestinal negative   Genitourinary negative   Musculoskeletal back pain   Integumentary negative   Neurological negative   Endocrine negative   Other Symptoms none       Past Psychiatric History:      Past Inpatient Psychiatric Treatment:   No history of past inpatient psychiatric admissions  Past Outpatient Psychiatric Treatment:    In outpatient treatment at [address] [place] [address] [address] for many years    Past Suicide Attempts: no  Past Violent Behavior: no  Past Psychiatric Medication Trials: Lamictal, Klonopin, Strattera and Adderall XR     Traumatic History:      Abuse: no history of sexual abuse, no history of physical abuse  Other Traumatic Events: none     Past Medical History:    Past Medical History:   Diagnosis Date    [facility] joint ([facility] )     CAD (coronary artery disease)     Heart attack ([place] [address] )     Hyperlipidemia     Hypertension      Past Medical History Pertinent Negatives:   Diagnosis Date Noted    Head injury     Seizures ([place] [address] )      Past Surgical History:   Procedure Laterality Date    APPENDECTOMY      CORONARY ANGIOPLASTY WITH STENT PLACEMENT      KNEE ARTHROTOMY      SEPTOPLASTY      SHOULDER SURGERY       Allergies   Allergen Reactions    Atorvastatin      Tolerates crestor    Cefazolin Rash    Ceftriaxone Rash       Substance Abuse History:    Social History     Substance and Sexual Activity   Alcohol Use Yes    Frequency: 2-4 times a month    Drinks per session: 3 or 4    Binge frequency: Never    Comment: social alcohol use     Social History     Substance and Sexual Activity   Drug Use No    Comment: History of cocaine use years ago  No current recreational drug use       Social History:    Social History     Socioeconomic History    Marital status: /Civil Union     Spouse name: Not on file    Number of children: 1    Years of education: bachelor's degree    Highest education level: Bachelor's degree (e g , BA, AB, BS)   Occupational History    Occupation: works in [employer]   Social Needs    Financial resource strain: Not hard at all   Mitali-Petar insecurity:     Worry: Never true     Inability: Never true   [employer] needs:     Medical: No     Non-medical: No   Tobacco Use    Smoking status: Never Smoker    Smokeless tobacco: Never Used   Substance and Sexual Activity    Alcohol use: Yes     Frequency: 2-4 times a month     Drinks per session: 3 or 4     Binge frequency: Never     Comment: social alcohol use    Drug use: No     Comment: History of cocaine use years ago   No current recreational drug use    Sexual activity: Yes     Partners: Female   Lifestyle    Physical activity:     Days per week: 4 days     Minutes per session: 60 min    Stress: Only a little   Relationships    Social connections:     Talks on phone: More than three times a week     Gets together: More than three times a week     Attends [employer] service: Never     Active member of club or organization: Yes     Attends meetings of clubs or organizations: More than 4 times per year     Relationship status:     Intimate partner violence:     Fear of current or ex partner: No     Emotionally abused: No     Physically abused: No     Forced sexual activity: No   Other Topics Concern    Not on file   Social History Narrative    Education: bachelor's degree in engineering    Learning Disabilities: ADHD history    Marital History:     Children: 1 daughter    Living Arrangement: lives in home with wife and daughter    Occupational History: works in sales for [employer] Relationships: good support system    Legal History: none     History: None       Family Psychiatric History:     Family History   Problem Relation Age of Onset    Anxiety disorder Mother        History Review:  The following portions of the patient's history were reviewed and updated as appropriate: allergies, current medications, past family history, past medical history, past social history, past surgical history and problem list          OBJECTIVE:     Vital signs in last 24 hours:    Vitals:    03/20/19 1436   BP: 141/87   Pulse: 105   Weight: 114 kg (252 lb)   Height: 6' 1" (1 854 m)       Mental Status Evaluation:    Appearance age appropriate, casually dressed   Behavior cooperative, calm   Speech normal rate, normal volume, normal pitch   Mood euthymic   Affect normal range and intensity, appropriate   Thought Processes organized, goal directed   Associations intact associations   Thought Content no overt delusions   Perceptual Disturbances: no auditory hallucinations, no visual hallucinations   Abnormal Thoughts  Risk Potential Suicidal ideation - None  Homicidal ideation - None  Potential for aggression - No   Orientation oriented to person, place, time/date and situation   Memory recent and remote memory grossly intact   Consciousness alert and awake   Attention Span Concentration Span attention span and concentration are age appropriate   Intellect appears to be of average intelligence   Insight intact   Judgement intact   Muscle Strength and  Gait normal muscle strength and normal muscle tone, normal gait and normal balance   Motor activity no abnormal movements   Language no difficulty naming common objects, no difficulty repeating a phrase, no difficulty writing a sentence   Fund of Knowledge adequate knowledge of current events  adequate fund of knowledge regarding past history  adequate fund of knowledge regarding vocabulary    Pain mild   Pain Scale 4       Laboratory Results: I have personally reviewed all pertinent laboratory/tests results  Recent Labs (last 4 months):   No visits with results within 4 Month(s) from this visit  Latest known visit with results is:   No results found for any previous visit  Assessment/Plan:       Diagnoses and all orders for this visit:    Bipolar I disorder, most recent episode depressed, in full remission ([facility] [address] )  -     lamoTRIgine (LaMICtal) 200 MG tablet; Take 2 tablets (400 mg total) by mouth daily at bedtime for 270 days    CODEY (generalized anxiety disorder)  -     clonazePAM (KlonoPIN) 0 5 MG disintegrating tablet;  Take 1 tablet (0 5 mg total) by mouth 2 (two) times a day as needed for anxiety for up to 30 days To be filled on or after 4/13/19    ADHD (attention deficit hyperactivity disorder), combined type    Other orders  -     clonazePAM (KlonoPIN) 1 mg tablet; TAKE 1/2 TABLET BY MOUTH 2 TIMES A DAY AS NEEDED FOR ANXIETY        Treatment Recommendations/Precautions:    Continue Lamictal 400 mg at bedtime to help with mood stabilization  Continue Klonopin 0 5 mg bid PRN to improve anxiety symptoms  Medication management every 4 months    Risks/Benefits      Risks, Benefits And Possible Side Effects Of Medications:    Risks, benefits, and possible side effects of medications explained to Isrrael including risk of rash related to treatment with Lamictal and risks of dependence, sedation and respiratory depression related to treatment with benzodiazepine medications  He verbalizes understanding and agreement for treatment  Controlled Medication Discussion:     Isrrael has been filling controlled prescriptions on time as prescribed according to [facility]    Discussed with Isrrael the risks of sedation, respiratory depression, impairment of ability to drive and potential for abuse and addiction related to treatment with benzodiazepine medications  He understands risk of treatment with benzodiazepine medications, agrees to not drive if feels impaired and agrees to take medications as prescribed  Psychotherapy Provided:     Individual psychotherapy provided: Yes  Counseling was provided during the session today for 20 minutes  Medications, treatment progress and treatment plan reviewed with Isrrael  Goals discussed during in session: improve control of anxiety and maintain mood stability  Discussed with Isrrael coping with ongoing anxiety  Coping mechanisms including exercising, spending time with daughter and taking time for self reviewed with Isrrael  Supportive therapy provided        Treatment Plan;    Completed and signed during the session: Yes - with Booker Fitzgerald MD 03/20/19

## 2019-03-20 ENCOUNTER — OFFICE VISIT (OUTPATIENT)
Dept: PSYCHIATRY | Facility: CLINIC | Age: 44
End: 2019-03-20
Payer: COMMERCIAL

## 2019-03-20 VITALS
DIASTOLIC BLOOD PRESSURE: 87 MMHG | HEIGHT: 73 IN | SYSTOLIC BLOOD PRESSURE: 141 MMHG | HEART RATE: 105 BPM | WEIGHT: 252 LBS | BODY MASS INDEX: 33.4 KG/M2

## 2019-03-20 DIAGNOSIS — F31.76 BIPOLAR I DISORDER, MOST RECENT EPISODE DEPRESSED, IN FULL REMISSION (HCC): Primary | Chronic | ICD-10-CM

## 2019-03-20 DIAGNOSIS — F41.1 GAD (GENERALIZED ANXIETY DISORDER): Chronic | ICD-10-CM

## 2019-03-20 DIAGNOSIS — F90.2 ADHD (ATTENTION DEFICIT HYPERACTIVITY DISORDER), COMBINED TYPE: Chronic | ICD-10-CM

## 2019-03-20 PROCEDURE — 90833 PSYTX W PT W E/M 30 MIN: CPT | Performed by: PSYCHIATRY & NEUROLOGY

## 2019-03-20 PROCEDURE — 99213 OFFICE O/P EST LOW 20 MIN: CPT | Performed by: PSYCHIATRY & NEUROLOGY

## 2019-03-20 RX ORDER — CLONAZEPAM 0.5 MG/1
0.5 TABLET, ORALLY DISINTEGRATING ORAL 2 TIMES DAILY PRN
Qty: 60 TABLET | Refills: 0 | Status: SHIPPED | OUTPATIENT
Start: 2019-04-13 | End: 2019-12-30 | Stop reason: ALTCHOICE

## 2019-03-20 RX ORDER — LAMOTRIGINE 200 MG/1
400 TABLET ORAL
Qty: 180 TABLET | Refills: 2 | Status: SHIPPED | OUTPATIENT
Start: 2019-03-20 | End: 2019-12-30 | Stop reason: SDUPTHER

## 2019-03-20 RX ORDER — CLONAZEPAM 1 MG/1
TABLET ORAL
Refills: 1 | COMMUNITY
Start: 2019-01-13 | End: 2019-05-31 | Stop reason: SDUPTHER

## 2019-03-20 NOTE — PSYCH
TREATMENT PLAN (Medication Management Only)        [facility]    Name/Date of Birth/MRN:  Melissa Hardy 39 y o  1975 MRN: [id number]  Date of Treatment Plan: March 20, 2019  Diagnosis/Diagnoses:   1  Bipolar I disorder, most recent episode depressed, in full remission ([facility] [address] )    2  CODEY (generalized anxiety disorder)    3  ADHD (attention deficit hyperactivity disorder), combined type      Strengths/Personal Resources for Self-Care: "family, active lifestyle"  Area/Areas of need (in own words): "stress management, weight"  1  Long Term Goal: improve control of anxiety  Target Date: 4 months - 7/20/2019  Person/Persons responsible for completion of goal: Rufina Cronin  2  Short Term Objective (s) - How will we reach this goal?:   A  Provider new recommended medication/dosage changes and/or continue medication(s): continue current medications as prescribed (Lamictal and Klonopin)  B   N/A   C   N/A  Target Date: 4 months - 7/20/2019  Person/Persons Responsible for Completion of Goal: Rufina Cronin   Progress Towards Goals: stable  Treatment Modality: medication management every 4 months  Review due 90 to 120 days from date of this plan: 4 months - 7/20/2019  Expected length of service: maintenance  My Physician/PA/NP and I have developed this plan together and I agree to work on the goals and objectives  I understand the treatment goals that were developed for my treatment    Signature:       Date and time:  Signature of parent/guardian if under age of 15 years: Date and time:  Signature of provider:      Date and time:  Signature of Supervising Physician:    Date and time: 3/20/2019      Ezekiel Morales MD

## 2019-04-21 ENCOUNTER — OFFICE VISIT (OUTPATIENT)
Dept: URGENT CARE | Facility: MEDICAL CENTER | Age: 44
End: 2019-04-21
Payer: COMMERCIAL

## 2019-04-21 VITALS
HEART RATE: 110 BPM | HEIGHT: 73 IN | RESPIRATION RATE: 18 BRPM | OXYGEN SATURATION: 100 % | TEMPERATURE: 98.1 F | SYSTOLIC BLOOD PRESSURE: 116 MMHG | BODY MASS INDEX: 34.19 KG/M2 | DIASTOLIC BLOOD PRESSURE: 82 MMHG | WEIGHT: 258 LBS

## 2019-04-21 DIAGNOSIS — S61.213A LACERATION OF LEFT MIDDLE FINGER WITHOUT FOREIGN BODY WITHOUT DAMAGE TO NAIL, INITIAL ENCOUNTER: Primary | ICD-10-CM

## 2019-04-21 PROCEDURE — 99213 OFFICE O/P EST LOW 20 MIN: CPT

## 2019-04-21 PROCEDURE — 12001 RPR S/N/AX/GEN/TRNK 2.5CM/<: CPT | Performed by: PHYSICIAN ASSISTANT

## 2019-04-21 PROCEDURE — 90715 TDAP VACCINE 7 YRS/> IM: CPT

## 2019-04-21 PROCEDURE — 90471 IMMUNIZATION ADMIN: CPT | Performed by: PHYSICIAN ASSISTANT

## 2019-05-31 DIAGNOSIS — F41.1 GAD (GENERALIZED ANXIETY DISORDER): Primary | Chronic | ICD-10-CM

## 2019-05-31 RX ORDER — CLONAZEPAM 1 MG/1
0.5 TABLET ORAL 2 TIMES DAILY PRN
Qty: 30 TABLET | Refills: 1 | Status: SHIPPED | OUTPATIENT
Start: 2019-05-31 | End: 2019-09-24 | Stop reason: SDUPTHER

## 2019-07-23 ENCOUNTER — DOCUMENTATION (OUTPATIENT)
Dept: PSYCHIATRY | Facility: CLINIC | Age: 44
End: 2019-07-23

## 2019-07-23 NOTE — PROGRESS NOTES
Treatment Plan not completed within required time limits due to:  Follow Up  from last Treatment Plan schedule over 120 days from OV

## 2019-07-26 ENCOUNTER — TELEPHONE (OUTPATIENT)
Dept: PSYCHIATRY | Facility: CLINIC | Age: 44
End: 2019-07-26

## 2019-07-29 ENCOUNTER — DOCUMENTATION (OUTPATIENT)
Dept: PSYCHIATRY | Facility: CLINIC | Age: 44
End: 2019-07-29

## 2019-07-29 NOTE — PROGRESS NOTES
Treatment Plan not completed within required time limits due to: Franky Perera  cancelled appointment  on 7/26/2019

## 2019-08-30 ENCOUNTER — OFFICE VISIT (OUTPATIENT)
Dept: URGENT CARE | Facility: MEDICAL CENTER | Age: 44
End: 2019-08-30
Payer: COMMERCIAL

## 2019-08-30 VITALS
OXYGEN SATURATION: 98 % | HEIGHT: 73 IN | HEART RATE: 74 BPM | DIASTOLIC BLOOD PRESSURE: 82 MMHG | TEMPERATURE: 98 F | SYSTOLIC BLOOD PRESSURE: 133 MMHG | BODY MASS INDEX: 31.74 KG/M2 | WEIGHT: 239.5 LBS | RESPIRATION RATE: 20 BRPM

## 2019-08-30 DIAGNOSIS — J06.9 ACUTE URI: Primary | ICD-10-CM

## 2019-08-30 PROCEDURE — 99213 OFFICE O/P EST LOW 20 MIN: CPT | Performed by: PHYSICIAN ASSISTANT

## 2019-08-30 RX ORDER — BENZONATATE 100 MG/1
100 CAPSULE ORAL 3 TIMES DAILY PRN
Qty: 20 CAPSULE | Refills: 0 | Status: SHIPPED | OUTPATIENT
Start: 2019-08-30 | End: 2019-12-30 | Stop reason: ALTCHOICE

## 2019-08-30 RX ORDER — ALBUTEROL SULFATE 90 UG/1
2 AEROSOL, METERED RESPIRATORY (INHALATION) EVERY 6 HOURS PRN
Qty: 8.5 G | Refills: 0 | Status: SHIPPED | OUTPATIENT
Start: 2019-08-30 | End: 2019-12-30 | Stop reason: ALTCHOICE

## 2019-08-30 NOTE — PROGRESS NOTES
[facility]        NAME: Herminio Crystal is a 38 y o  male  : 1975    MRN: [id number]  DATE: 2019  TIME: 8:23 AM    Assessment and Plan   Acute URI [J06 9]  1  Acute URI  albuterol (PROAIR HFA) 90 mcg/act inhaler    benzonatate (TESSALON PERLES) 100 mg capsule         Patient Instructions     Upper respiratory infection  proventil 2 puffs every 6 hours as needed  Tessalon pearls as directed  Follow up with PCP in 3-5 days  Proceed to  ER if symptoms worsen  Chief Complaint     Chief Complaint   Patient presents with    Nasal Congestion     started yesterday with cough,stated that he brought up yellow and green sputum  nite quil   then during night took inhaler  History of Present Illness       37 y/o male presents c/o cough productive of yellow sputum associated with nasal congestion x 2 days  Denies chest pain, SOB, nausea, vomiting, fever, chills      Review of Systems   Review of Systems   Constitutional: Negative  HENT: Negative for congestion, dental problem, drooling, ear pain, postnasal drip, rhinorrhea, sinus pain, sore throat, trouble swallowing and voice change  Eyes: Negative  Respiratory: Positive for cough  Negative for apnea, choking, chest tightness, shortness of breath, wheezing and stridor  Cardiovascular: Negative  Negative for chest pain           Current Medications       Current Outpatient Medications:     aspirin 81 MG tablet, Take 1 tablet by mouth daily, Disp: , Rfl:     Coenzyme Q10 (COQ-10) 100 MG CAPS, Take 1 capsule by mouth 2 (two) times a day, Disp: , Rfl:     lamoTRIgine (LaMICtal) 200 MG tablet, Take 2 tablets (400 mg total) by mouth daily at bedtime for 270 days, Disp: 180 tablet, Rfl: 2    lisinopril (ZESTRIL) 5 mg tablet, Take 5 mg by mouth, Disp: , Rfl:     metoprolol succinate (TOPROL-XL) 50 mg 24 hr tablet, Take 1 5 tablets by mouth daily, Disp: , Rfl:     nitroglycerin (NITROSTAT) 0 4 mg SL tablet, Place 0 4 mg under the tongue, Disp: , Rfl:     prasugrel (EFFIENT) tablet, Take 1 tablet by mouth daily, Disp: , Rfl:     rosuvastatin (CRESTOR) 10 MG tablet, Take 10 mg by mouth, Disp: , Rfl:     TURMERIC PO, Take by mouth, Disp: , Rfl:     albuterol (PROAIR HFA) 90 mcg/act inhaler, Inhale 2 puffs every 6 (six) hours as needed for wheezing, Disp: 8 5 g, Rfl: 0    benzonatate (TESSALON PERLES) 100 mg capsule, Take 1 capsule (100 mg total) by mouth 3 (three) times a day as needed for cough, Disp: 20 capsule, Rfl: 0    clonazePAM (KlonoPIN) 0 5 MG disintegrating tablet, Take 1 tablet (0 5 mg total) by mouth 2 (two) times a day as needed for anxiety for up to 30 days To be filled on or after 4/13/19, Disp: 60 tablet, Rfl: 0    clonazePAM (KlonoPIN) 1 mg tablet, Take 0 5 tablets (0 5 mg total) by mouth 2 (two) times a day as needed for anxiety for up to 60 days, Disp: 30 tablet, Rfl: 1    Current Allergies     Allergies as of 08/30/2019 - Reviewed 08/30/2019   Allergen Reaction Noted    Atorvastatin  01/24/2017    Cefazolin Rash 07/01/2015    Ceftriaxone Rash 07/01/2015            The following portions of the patient's history were reviewed and updated as appropriate: allergies, current medications, past family history, past medical history, past social history, past surgical history and problem list      Past Medical History:   Diagnosis Date    Bacterial infection of knee joint ([address] )     CAD (coronary artery disease)     Heart attack ([facility] [address] )     Hyperlipidemia     Hypertension        Past Surgical History:   Procedure Laterality Date    APPENDECTOMY      CORONARY ANGIOPLASTY WITH STENT PLACEMENT      KNEE ARTHROTOMY      SEPTOPLASTY      SHOULDER SURGERY         Family History   Problem Relation Age of Onset    Anxiety disorder Mother          Medications have been verified          Objective   /82   Pulse 74   Temp 98 °F (36 7 °C) (Temporal)   Resp 20   Ht 6' 1" (1 854 m)   Wt 109 kg (239 lb 8 oz)   SpO2 98%   BMI 31 60 kg/m²        Physical Exam     Physical Exam   Constitutional: He appears well-developed and well-nourished  No distress  HENT:   Head: Normocephalic and atraumatic  Right Ear: Hearing, tympanic membrane, external ear and ear canal normal    Left Ear: Hearing, tympanic membrane, external ear and ear canal normal    Nose: Rhinorrhea present  Right sinus exhibits no maxillary sinus tenderness and no frontal sinus tenderness  Left sinus exhibits no maxillary sinus tenderness and no frontal sinus tenderness  Mouth/Throat: Uvula is midline, oropharynx is clear and moist and mucous membranes are normal    Neck: Normal range of motion  Neck supple  Cardiovascular: Normal rate, regular rhythm, normal heart sounds and intact distal pulses  Pulmonary/Chest: Effort normal and breath sounds normal  No stridor  No respiratory distress  He has no wheezes  He has no rales  He exhibits no tenderness  Lymphadenopathy:     He has no cervical adenopathy  Skin: He is not diaphoretic

## 2019-08-30 NOTE — PATIENT INSTRUCTIONS
Upper respiratory infection  proventil 2 puffs every 6 hours as needed  Tessalon pearls as directed  Follow up with PCP in 3-5 days  Proceed to  ER if symptoms worsen  Pharyngitis   WHAT YOU NEED TO KNOW:   Pharyngitis, or sore throat, is inflammation of the tissues and structures in your pharynx (throat)  Pharyngitis is most often caused by bacteria  It may also be caused by a cold or flu virus  Other causes include smoking, allergies, or acid reflux  DISCHARGE INSTRUCTIONS:   Call 911 for any of the following:   · You have trouble breathing or swallowing because your throat is swollen or sore  Return to the emergency department if:   · You are drooling because it hurts too much to swallow  · Your fever is higher than 102? F (39?C) or lasts longer than 3 days  · You are confused  · You taste blood in your throat  Contact your healthcare provider if:   · Your throat pain gets worse  · You have a painful lump in your throat that does not go away after 5 days  · Your symptoms do not improve after 5 days  · You have questions or concerns about your condition or care  Medicines:  Viral pharyngitis will go away on its own without treatment  Your sore throat should start to feel better in 3 to 5 days for both viral and bacterial infections  You may need any of the following:  · Antibiotics  treat a bacterial infection  · NSAIDs , such as ibuprofen, help decrease swelling, pain, and fever  NSAIDs can cause stomach bleeding or kidney problems in certain people  If you take blood thinner medicine, always ask your healthcare provider if NSAIDs are safe for you  Always read the medicine label and follow directions  · Acetaminophen  decreases pain and fever  It is available without a doctor's order  Ask how much to take and how often to take it  Follow directions  Acetaminophen can cause liver damage if not taken correctly  · Take your medicine as directed    Contact your healthcare provider if you think your medicine is not helping or if you have side effects  Tell him or her if you are allergic to any medicine  Keep a list of the medicines, vitamins, and herbs you take  Include the amounts, and when and why you take them  Bring the list or the pill bottles to follow-up visits  Carry your medicine list with you in case of an emergency  Manage your symptoms:   · Gargle salt water  Mix ¼ teaspoon salt in an 8 ounce glass of warm water and gargle  This may help decrease swelling in your throat  · Drink liquids as directed  You may need to drink more liquids than usual  Liquids may help soothe your throat and prevent dehydration  Ask how much liquid to drink each day and which liquids are best for you  · Use a cool-steam humidifier  to help moisten the air in your room and calm your cough  · Soothe your throat  with cough drops, ice, soft foods, or popsicles  Prevent the spread of pharyngitis:  Cover your mouth and nose when you cough or sneeze  Do not share food or drinks  Wash your hands often  Use soap and water  If soap and water are unavailable, use an alcohol based hand   Follow up with your healthcare provider as directed:  Write down your questions so you remember to ask them during your visits  © 2017 [address] Information is for End User's use only and may not be sold, redistributed or otherwise used for commercial purposes  All illustrations and images included in CareNotes® are the copyrighted property of A D A M , Inc  or Hugo Stout  The above information is an  only  It is not intended as medical advice for individual conditions or treatments  Talk to your doctor, nurse or pharmacist before following any medical regimen to see if it is safe and effective for you

## 2019-09-18 DIAGNOSIS — F41.1 GAD (GENERALIZED ANXIETY DISORDER): Chronic | ICD-10-CM

## 2019-09-18 RX ORDER — CLONAZEPAM 1 MG/1
TABLET ORAL
Qty: 30 TABLET | Refills: 1 | OUTPATIENT
Start: 2019-09-18

## 2019-09-23 DIAGNOSIS — F41.1 GAD (GENERALIZED ANXIETY DISORDER): Chronic | ICD-10-CM

## 2019-09-23 RX ORDER — CLONAZEPAM 1 MG/1
0.5 TABLET ORAL 2 TIMES DAILY PRN
Qty: 30 TABLET | Refills: 1 | OUTPATIENT
Start: 2019-09-23 | End: 2019-11-22

## 2019-09-24 DIAGNOSIS — F41.1 GAD (GENERALIZED ANXIETY DISORDER): Primary | Chronic | ICD-10-CM

## 2019-09-24 RX ORDER — CLONAZEPAM 1 MG/1
0.5 TABLET ORAL 2 TIMES DAILY PRN
Qty: 30 TABLET | Refills: 2 | Status: SHIPPED | OUTPATIENT
Start: 2019-09-24 | End: 2019-12-30 | Stop reason: SDUPTHER

## 2019-12-26 NOTE — PSYCH
MEDICATION MANAGEMENT NOTE        [facility]      Name and Date of Birth:  Victor Hugo Piper 38 y o  1975 MRN: [id number]    Date of Visit: December 30, 2019    SUBJECTIVE:    Shree Nunes is seen today for a follow up for Bipolar Disorder, Generalized Anxiety Disorder and ADHD  He has done well since the last visit  He reports that mood continues to be stable, denies any significant depressive symptoms or manic symptoms  He still has on and off anxiety symptoms - states that PRN Klonopin helps with that  He has been doing well at work, although sometimes he feels stressed out at his job  He has been trying to come with that by exercising, following healthy diet and "slowing down sometimes"  He denies any suicidal ideation, intent or plan at present; denies any homicidal ideation, intent or plan at present  He has no auditory hallucinations, denies any visual hallucinations, has no delusional thoughts  He denies any side effects from current psychiatric medications  No rash noted or reported  Venita Vinh HPI ROS Appetite Changes and Sleep:     He reports normal sleep, normal appetite, recent weight loss (7 lbs), normal energy level    Review Of Systems:      Constitutional recent weight loss (7 lbs)   ENT negative   Cardiovascular negative   Respiratory negative   Gastrointestinal negative   Genitourinary negative   Musculoskeletal muscle aches   Integumentary negative   Neurological negative   Endocrine negative   Other Symptoms all other systems are negative       Past Psychiatric History:      Past Inpatient Psychiatric Treatment:   No history of past inpatient psychiatric admissions  Past Outpatient Psychiatric Treatment:    In outpatient treatment at [address] [place] [address] E for many years    Past Suicide Attempts: no  Past Violent Behavior: no  Past Psychiatric Medication Trials: Lamictal, Klonopin, Strattera and Adderall XR     Traumatic History:    Abuse: no history of sexual abuse, no history of physical abuse  Other Traumatic Events: none     Past Medical History:    Past Medical History:   Diagnosis Date    Bacterial infection of knee joint ([place] [address] )     CAD (coronary artery disease)     Heart attack ([place] [address] )     Hyperlipidemia     Hypertension      Past Medical History Pertinent Negatives:   Diagnosis Date Noted    Head injury     Seizures ([place] [address] )      Past Surgical History:   Procedure Laterality Date    APPENDECTOMY      CORONARY ANGIOPLASTY WITH STENT PLACEMENT      KNEE ARTHROTOMY      SEPTOPLASTY      SHOULDER SURGERY       Allergies   Allergen Reactions    Atorvastatin      Tolerates crestor    Cefazolin Rash    Ceftriaxone Rash       Substance Abuse History:    Social History     Substance and Sexual Activity   Alcohol Use Yes    Frequency: 2-4 times a month    Drinks per session: 3 or 4    Binge frequency: Never    Comment: social alcohol use     Social History     Substance and Sexual Activity   Drug Use No    Comment: History of cocaine use years ago  No current recreational drug use       Social History:    Social History     Socioeconomic History    Marital status: /Civil Union     Spouse name: Not on file    Number of children: 1    Years of education: bachelor's degree    Highest education level: Bachelor's degree (e g , BA, AB, BS)   Occupational History    Occupation: works in sales   Social Needs    Financial resource strain: Not hard at all   [place] insecurity:     Worry: Never true     Inability: Never true   [employer] needs:     Medical: No     Non-medical: No   Tobacco Use    Smoking status: Never Smoker    Smokeless tobacco: Never Used   Substance and Sexual Activity    Alcohol use: Yes     Frequency: 2-4 times a month     Drinks per session: 3 or 4     Binge frequency: Never     Comment: social alcohol use    Drug use: No     Comment: History of cocaine use years ago   No current recreational drug use    Sexual activity: Yes     Partners: Female   Lifestyle    Physical activity:     Days per week: 4 days     Minutes per session: 60 min    Stress: Only a little   Relationships    Social connections:     Talks on phone: More than three times a week     Gets together: More than three times a week     Attends Jew service: Never     Active member of club or organization: Yes     Attends meetings of clubs or organizations: More than 4 times per year     Relationship status:     Intimate partner violence:     Fear of current or ex partner: No     Emotionally abused: No     Physically abused: No     Forced sexual activity: No   Other Topics Concern    Not on file   Social History Narrative    Education: bachelor's degree in engineering    Learning Disabilities: ADHD history    Marital History:     Children: 1 daughter    Living Arrangement: lives in home with wife and daughter    Occupational History: works in sales for [employer] Relationships: good support system    Legal History: none     History: None       Family Psychiatric History:     Family History   Problem Relation Age of Onset    Anxiety disorder Mother        History Review:  The following portions of the patient's history were reviewed and updated as appropriate: allergies, current medications, past family history, past medical history, past social history, past surgical history and problem list          OBJECTIVE:     Vital signs in last 24 hours:    Vitals:    12/30/19 1407   BP: 108/75   Pulse: 73   Weight: 111 kg (245 lb)   Height: 6' 1" (1 854 m)       Mental Status Evaluation:    Appearance age appropriate, casually dressed   Behavior cooperative, calm   Speech normal rate, normal volume, normal pitch   Mood euthymic   Affect normal range and intensity, appropriate   Thought Processes organized, goal directed   Associations intact associations   Thought Content no overt delusions Perceptual Disturbances: no auditory hallucinations, no visual hallucinations   Abnormal Thoughts  Risk Potential Suicidal ideation - None  Homicidal ideation - None  Potential for aggression - No   Orientation oriented to person, place, time/date and situation   Memory recent and remote memory grossly intact   Consciousness alert and awake   Attention Span Concentration Span attention span and concentration are age appropriate   Intellect appears to be of average intelligence   Insight intact   Judgement intact   Muscle Strength and  Gait normal muscle strength and normal muscle tone, normal gait and normal balance   Motor activity no abnormal movements   Language no difficulty naming common objects, no difficulty repeating a phrase, no difficulty writing a sentence   Fund of Knowledge adequate knowledge of current events  adequate fund of knowledge regarding past history  adequate fund of knowledge regarding vocabulary    Pain moderate   Pain Scale 5       Laboratory Results: I have personally reviewed all pertinent laboratory/tests results  Recent Labs (last 4 months):   No visits with results within 4 Month(s) from this visit  Latest known visit with results is:   No results found for any previous visit  Suicide/Homicide Risk Assessment:    Risk of Harm to Self:  Demographic risk factors include: , male  Historical Risk Factors include: history of anxiety, history of mood disorder  Recent Specific Risk Factors include: diagnosis of mood disorder  Protective Factors: no current suicidal ideation, being a parent, being , compliant with medications, responsibilities and duties to others, stable living environment, stable job  Weapons: none   The following steps have been taken to ensure weapons are properly secured: not applicable  Based on today's assessment, Daniela Cantu presents the following risk of harm to self: none    Risk of Harm to Others:  Based on today's assessment, Daniela Cantu presents the following risk of harm to others: none    The following interventions are recommended: no intervention changes needed    Assessment/Plan:       Diagnoses and all orders for this visit:    Bipolar I disorder, most recent episode depressed, in full remission ([facility] [address] )  -     lamoTRIgine (LaMICtal) 200 MG tablet; Take 2 tablets (400 mg total) by mouth daily at bedtime    CODEY (generalized anxiety disorder)  -     clonazePAM (KlonoPIN) 1 mg tablet; Take 0 5 tablets (0 5 mg total) by mouth 2 (two) times a day as needed for anxiety    ADHD (attention deficit hyperactivity disorder), combined type          Treatment Recommendations/Precautions:    Continue Lamictal 400 mg at bedtime to help with mood stabilization  Continue Klonopin 0 5 mg bid PRN to improve anxiety symptoms  Medication management every 4 months  Aware of 24 hour and weekend coverage for urgent situations accessed by calling [facility] main practice number    Medications Risks/Benefits      Risks, Benefits And Possible Side Effects Of Medications:    Risks, benefits, and possible side effects of medications explained to Danny Lagosyelena including risk of rash related to treatment with Lamictal and risks of misuse, abuse or dependence, sedation and respiratory depression related to treatment with benzodiazepine medications  He verbalizes understanding and agreement for treatment  Controlled Medication Discussion:     Danny Aguilar has been filling controlled prescriptions on time as prescribed according to [facility]  Discussed with Danny Lagosyelena the risks of sedation, respiratory depression, impairment of ability to drive and potential for abuse and addiction related to treatment with benzodiazepine medications   He understands risk of treatment with benzodiazepine medications, agrees to not drive if feels impaired and agrees to take medications as prescribed    Psychotherapy Provided:     Individual psychotherapy provided: Yes  Counseling was provided during the session today for 16 minutes  Medications, treatment progress and treatment plan reviewed with Isrrael  Goals discussed during in session: maintain control of anxiety and maintain mood stability  Discussed with Isrrael coping with job stress and occasional anxiety  Coping techniques including exercising, maintain healthy diet, reducing time spent worrying, spending time with family and taking time for self reviewed with Isrrael  Supportive therapy provided        Treatment Plan:    Completed and signed during the session: Yes - with Gomez Emmanuel MD 12/30/19

## 2019-12-30 ENCOUNTER — OFFICE VISIT (OUTPATIENT)
Dept: PSYCHIATRY | Facility: CLINIC | Age: 44
End: 2019-12-30
Payer: COMMERCIAL

## 2019-12-30 VITALS
WEIGHT: 245 LBS | HEART RATE: 73 BPM | DIASTOLIC BLOOD PRESSURE: 75 MMHG | HEIGHT: 73 IN | BODY MASS INDEX: 32.47 KG/M2 | SYSTOLIC BLOOD PRESSURE: 108 MMHG

## 2019-12-30 DIAGNOSIS — F90.2 ADHD (ATTENTION DEFICIT HYPERACTIVITY DISORDER), COMBINED TYPE: Chronic | ICD-10-CM

## 2019-12-30 DIAGNOSIS — F41.1 GAD (GENERALIZED ANXIETY DISORDER): Chronic | ICD-10-CM

## 2019-12-30 DIAGNOSIS — F31.76 BIPOLAR I DISORDER, MOST RECENT EPISODE DEPRESSED, IN FULL REMISSION (HCC): Primary | Chronic | ICD-10-CM

## 2019-12-30 PROCEDURE — 90833 PSYTX W PT W E/M 30 MIN: CPT | Performed by: PSYCHIATRY & NEUROLOGY

## 2019-12-30 PROCEDURE — 99213 OFFICE O/P EST LOW 20 MIN: CPT | Performed by: PSYCHIATRY & NEUROLOGY

## 2019-12-30 RX ORDER — CLONAZEPAM 1 MG/1
0.5 TABLET ORAL 2 TIMES DAILY PRN
Qty: 30 TABLET | Refills: 3 | Status: SHIPPED | OUTPATIENT
Start: 2019-12-30 | End: 2020-04-30 | Stop reason: SDUPTHER

## 2019-12-30 RX ORDER — LAMOTRIGINE 200 MG/1
400 TABLET ORAL
Qty: 180 TABLET | Refills: 2 | Status: SHIPPED | OUTPATIENT
Start: 2019-12-30 | End: 2020-12-07 | Stop reason: SDUPTHER

## 2019-12-30 NOTE — BH TREATMENT PLAN
TREATMENT PLAN (Medication Management Only)        [facility]    Name/Date of Birth/MRN:  Reginald Souza 38 y o  1975 MRN: [id number]  Date of Treatment Plan: December 30, 2019  Diagnosis/Diagnoses:   1  Bipolar I disorder, most recent episode depressed, in full remission ([facility] [address] )    2  CODEY (generalized anxiety disorder)    3  ADHD (attention deficit hyperactivity disorder), combined type      Strengths/Personal Resources for Self-Care: "compassion, able to engage well with others, positive outlook"  Area/Areas of need (in own words): "need to slow down at times, stress at times"  1  Long Term Goal:   "manage stress and overall well being "  Target Date: 4 months - 4/30/2020  Person/Persons responsible for completion of goal: Carmen Mclaughlin  2  Short Term Objective (s) - How will we reach this goal?:   A  Provider new recommended medication/dosage changes and/or continue medication(s): continue current medications as prescribed (Lamictal and Klonopin)  B   "active lifestyle and diet"  C   N/A  Target Date: 4 months - 4/30/2020  Person/Persons Responsible for Completion of Goal: Carmen Mclaughlin   Progress Towards Goals: stable  Treatment Modality: medication management every 4 months  Review due 180 days from date of this plan: 6 months - 6/30/2020  Expected length of service: maintenance unless revised  My Physician/PA/NP and I have developed this plan together and I agree to work on the goals and objectives  I understand the treatment goals that were developed for my treatment    Electronic Signatures: on file (unless signed below)    Haven Spann MD 12/30/19

## 2020-04-11 DIAGNOSIS — F41.1 GAD (GENERALIZED ANXIETY DISORDER): Chronic | ICD-10-CM

## 2020-04-13 RX ORDER — CLONAZEPAM 1 MG/1
TABLET ORAL
Qty: 30 TABLET | OUTPATIENT
Start: 2020-04-13

## 2020-04-30 ENCOUNTER — TELEMEDICINE (OUTPATIENT)
Dept: PSYCHIATRY | Facility: CLINIC | Age: 45
End: 2020-04-30
Payer: COMMERCIAL

## 2020-04-30 VITALS
WEIGHT: 250 LBS | SYSTOLIC BLOOD PRESSURE: 118 MMHG | HEART RATE: 65 BPM | DIASTOLIC BLOOD PRESSURE: 68 MMHG | HEIGHT: 73 IN | BODY MASS INDEX: 33.13 KG/M2

## 2020-04-30 DIAGNOSIS — F31.76 BIPOLAR I DISORDER, MOST RECENT EPISODE DEPRESSED, IN FULL REMISSION (HCC): Primary | Chronic | ICD-10-CM

## 2020-04-30 DIAGNOSIS — F41.1 GAD (GENERALIZED ANXIETY DISORDER): Chronic | ICD-10-CM

## 2020-04-30 DIAGNOSIS — F90.2 ADHD (ATTENTION DEFICIT HYPERACTIVITY DISORDER), COMBINED TYPE: Chronic | ICD-10-CM

## 2020-04-30 PROCEDURE — 99214 OFFICE O/P EST MOD 30 MIN: CPT | Performed by: PSYCHIATRY & NEUROLOGY

## 2020-04-30 PROCEDURE — 90833 PSYTX W PT W E/M 30 MIN: CPT | Performed by: PSYCHIATRY & NEUROLOGY

## 2020-04-30 RX ORDER — CLONAZEPAM 1 MG/1
.5-1 TABLET ORAL 2 TIMES DAILY PRN
Qty: 60 TABLET | Refills: 3 | Status: SHIPPED | OUTPATIENT
Start: 2020-04-30 | End: 2020-12-07 | Stop reason: SDUPTHER

## 2020-12-07 DIAGNOSIS — F31.76 BIPOLAR I DISORDER, MOST RECENT EPISODE DEPRESSED, IN FULL REMISSION (HCC): Chronic | ICD-10-CM

## 2020-12-07 DIAGNOSIS — F41.1 GAD (GENERALIZED ANXIETY DISORDER): Chronic | ICD-10-CM

## 2020-12-07 DIAGNOSIS — F31.76 BIPOLAR I DISORDER, MOST RECENT EPISODE DEPRESSED, IN FULL REMISSION (HCC): Primary | Chronic | ICD-10-CM

## 2020-12-07 RX ORDER — LAMOTRIGINE 200 MG/1
400 TABLET ORAL
Qty: 60 TABLET | Refills: 0 | Status: SHIPPED | OUTPATIENT
Start: 2020-12-07 | End: 2020-12-15 | Stop reason: SDUPTHER

## 2020-12-07 RX ORDER — CLONAZEPAM 1 MG/1
.5-1 TABLET ORAL 2 TIMES DAILY PRN
Qty: 60 TABLET | Refills: 0 | Status: SHIPPED | OUTPATIENT
Start: 2020-12-07 | End: 2020-12-15 | Stop reason: SDUPTHER

## 2020-12-11 RX ORDER — LAMOTRIGINE 200 MG/1
400 TABLET ORAL
Qty: 60 TABLET | Refills: 0 | OUTPATIENT
Start: 2020-12-11 | End: 2021-01-10

## 2020-12-11 RX ORDER — CLONAZEPAM 1 MG/1
.5-1 TABLET ORAL 2 TIMES DAILY PRN
Qty: 60 TABLET | Refills: 0 | OUTPATIENT
Start: 2020-12-11 | End: 2021-01-10

## 2020-12-15 ENCOUNTER — TELEPHONE (OUTPATIENT)
Dept: PSYCHIATRY | Facility: CLINIC | Age: 45
End: 2020-12-15

## 2020-12-15 ENCOUNTER — TELEMEDICINE (OUTPATIENT)
Dept: PSYCHIATRY | Facility: CLINIC | Age: 45
End: 2020-12-15
Payer: COMMERCIAL

## 2020-12-15 VITALS — HEIGHT: 73 IN | BODY MASS INDEX: 33.13 KG/M2 | WEIGHT: 250 LBS

## 2020-12-15 DIAGNOSIS — F90.2 ADHD (ATTENTION DEFICIT HYPERACTIVITY DISORDER), COMBINED TYPE: Chronic | ICD-10-CM

## 2020-12-15 DIAGNOSIS — F31.76 BIPOLAR I DISORDER, MOST RECENT EPISODE DEPRESSED, IN FULL REMISSION (HCC): Primary | Chronic | ICD-10-CM

## 2020-12-15 DIAGNOSIS — F41.1 GAD (GENERALIZED ANXIETY DISORDER): Chronic | ICD-10-CM

## 2020-12-15 PROBLEM — K21.9 GASTROESOPHAGEAL REFLUX DISEASE: Status: ACTIVE | Noted: 2020-12-14

## 2020-12-15 PROCEDURE — 99213 OFFICE O/P EST LOW 20 MIN: CPT | Performed by: PSYCHIATRY & NEUROLOGY

## 2020-12-15 PROCEDURE — 90833 PSYTX W PT W E/M 30 MIN: CPT | Performed by: PSYCHIATRY & NEUROLOGY

## 2020-12-15 RX ORDER — LAMOTRIGINE 200 MG/1
400 TABLET ORAL
Qty: 60 TABLET | Refills: 4 | Status: SHIPPED | OUTPATIENT
Start: 2020-12-15 | End: 2021-04-20 | Stop reason: SDUPTHER

## 2020-12-15 RX ORDER — OMEPRAZOLE 40 MG/1
40 CAPSULE, DELAYED RELEASE ORAL DAILY
COMMUNITY
Start: 2020-12-14 | End: 2021-04-23 | Stop reason: ALTCHOICE

## 2020-12-15 RX ORDER — CLONAZEPAM 1 MG/1
.5-1 TABLET ORAL 2 TIMES DAILY PRN
Qty: 60 TABLET | Refills: 4 | Status: SHIPPED | OUTPATIENT
Start: 2021-01-06 | End: 2021-04-23 | Stop reason: SDUPTHER

## 2021-03-31 DIAGNOSIS — F31.76 BIPOLAR I DISORDER, MOST RECENT EPISODE DEPRESSED, IN FULL REMISSION (HCC): Primary | Chronic | ICD-10-CM

## 2021-03-31 RX ORDER — LAMOTRIGINE 200 MG/1
TABLET ORAL
Qty: 60 TABLET | Refills: 3 | OUTPATIENT
Start: 2021-03-31

## 2021-04-20 RX ORDER — LAMOTRIGINE 200 MG/1
400 TABLET ORAL
Qty: 60 TABLET | Refills: 0 | Status: SHIPPED | OUTPATIENT
Start: 2021-04-20 | End: 2021-04-23 | Stop reason: SDUPTHER

## 2021-04-20 NOTE — TELEPHONE ENCOUNTER
Appointment scheduled for this Friday prescription was requested early due to mail order taking some time for patients to receive medication  He will be due for refill for klonopin as well

## 2021-04-20 NOTE — TELEPHONE ENCOUNTER
Lamictal escripted for 30 days, no RF   Klonopin not due until 6/5/21 - cannot be issued yet at this time

## 2021-04-23 ENCOUNTER — TELEPHONE (OUTPATIENT)
Dept: PSYCHIATRY | Facility: CLINIC | Age: 46
End: 2021-04-23

## 2021-04-23 ENCOUNTER — TELEMEDICINE (OUTPATIENT)
Dept: PSYCHIATRY | Facility: CLINIC | Age: 46
End: 2021-04-23
Payer: COMMERCIAL

## 2021-04-23 VITALS — WEIGHT: 248 LBS | BODY MASS INDEX: 32.87 KG/M2 | HEIGHT: 73 IN

## 2021-04-23 DIAGNOSIS — F90.2 ADHD (ATTENTION DEFICIT HYPERACTIVITY DISORDER), COMBINED TYPE: Chronic | ICD-10-CM

## 2021-04-23 DIAGNOSIS — F31.76 BIPOLAR I DISORDER, MOST RECENT EPISODE DEPRESSED, IN FULL REMISSION (HCC): Primary | Chronic | ICD-10-CM

## 2021-04-23 DIAGNOSIS — F41.1 GAD (GENERALIZED ANXIETY DISORDER): Chronic | ICD-10-CM

## 2021-04-23 PROCEDURE — 90833 PSYTX W PT W E/M 30 MIN: CPT | Performed by: PSYCHIATRY & NEUROLOGY

## 2021-04-23 PROCEDURE — 99214 OFFICE O/P EST MOD 30 MIN: CPT | Performed by: PSYCHIATRY & NEUROLOGY

## 2021-04-23 RX ORDER — CLONAZEPAM 1 MG/1
.5-1 TABLET ORAL 2 TIMES DAILY PRN
Qty: 60 TABLET | Refills: 3 | Status: SHIPPED | OUTPATIENT
Start: 2021-06-05 | End: 2021-10-19 | Stop reason: SDUPTHER

## 2021-04-23 RX ORDER — LAMOTRIGINE 200 MG/1
400 TABLET ORAL
Qty: 60 TABLET | Refills: 4 | Status: SHIPPED | OUTPATIENT
Start: 2021-04-23 | End: 2021-10-19 | Stop reason: SDUPTHER

## 2021-04-23 NOTE — PSYCH
Virtual Regular Visit      Assessment/Plan:    Problem List Items Addressed This Visit        Other    Bipolar I disorder, most recent episode depressed, in full remission ([facility] [address] ) - Primary (Chronic)    Relevant Medications    lamoTRIgine (LaMICtal) 200 MG tablet    CODEY (generalized anxiety disorder) (Chronic)    Relevant Medications    clonazePAM (KlonoPIN) 1 mg tablet (Start on 6/5/2021)    ADHD (attention deficit hyperactivity disorder), combined type (Chronic)          Reason for visit is   Chief Complaint   Patient presents with    Medication Management    Follow-up    Virtual Regular Visit      Encounter provider Rosa Cee MD    Provider located at [address] [id number] [phone]    Recent Visits  No visits were found meeting these conditions  Showing recent visits within past 7 days and meeting all other requirements     Today's Visits  Date Type Provider Dept   04/23/21 Telemedicine MD Oleg Carvalho 17 today's visits and meeting all other requirements     Future Appointments  No visits were found meeting these conditions  Showing future appointments within next 150 days and meeting all other requirements        The patient was identified by name and date of birth  Chandan Kapoor was informed that this is a telemedicine visit and that the visit is being conducted through [employer] and patient was informed that this is a secure, HIPAA-compliant platform  He agrees to proceed     My office door was closed  No one else was in the room  He acknowledged consent and understanding of privacy and security of the video platform  The patient has agreed to participate and understands they can discontinue the visit at any time  Patient is aware this is a billable service       Subjective:    Chandan Kapoor is a 54 y o  male with a history of Bipolar Disorder, Generalized Anxiety Disorder and ADHD  He continues to do fairly well since the last visit  He states that mood remains stable, denies any significant depressive symptoms or manic symptoms  He reports that anxiety symptoms are fairly well controlled  He has been frustrated with ongoing COVID-19 pandemic, but is glad that he was able to get both doses of COVID vaccine  He is hoping that once the [place] achieves herd immunity, 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 as for now he continues to work from home  He denies any suicidal ideation, intent or plan at present; denies any homicidal ideation, intent or plan at present  He has no auditory hallucinations, denies any visual hallucinations, has no delusional thinking  He denies any side effects from current psychiatric medications  No rash noted or reported  Jass HARTMAN ROS Appetite Changes and Sleep:     He reports normal sleep, adequate number of sleep hours (7 to 8 hours), normal appetite, recent weight loss (2 lbs), low energy    Current Rating Scores:     Not Applicable  Review Of Systems:      Constitutional low energy and recent weight loss (2 lbs)   ENT negative   Cardiovascular negative   Respiratory negative   Gastrointestinal negative   Genitourinary negative   Musculoskeletal negative   Integumentary negative   Neurological negative   Endocrine negative   Other Symptoms none, all other systems are negative       Past Psychiatric History: (unchanged information from previous note copied and updated)     Past Inpatient Psychiatric Treatment:   No history of past inpatient psychiatric admissions  Past Outpatient Psychiatric Treatment:    In outpatient treatment at [address] [place] [address] for many years    Past Suicide Attempts: no  Past Violent Behavior: no  Past Psychiatric Medication Trials: Lamictal, Klonopin, Strattera and Adderall XR    Traumatic History: (unchanged information from previous note copied and updated)    Abuse: no history of sexual abuse, no history of physical abuse  Other Traumatic Events: none     Substance Abuse History:    Social History     Substance and Sexual Activity   Alcohol Use Yes    Frequency: 2-4 times a month    Drinks per session: 3 or 4    Binge frequency: Never    Comment: social alcohol use     Social History     Substance and Sexual Activity   Drug Use No    Comment: History of cocaine use years ago  No current recreational drug use       Social History:    Social History     Socioeconomic History    Marital status: /Civil Union     Spouse name: Not on file    Number of children: 1    Years of education: bachelor's degree    Highest education level: Bachelor's degree (e g , BA, AB, BS)   Occupational History    Occupation: works in [employer]   Social Needs    Financial resource strain: Not hard at all   [employer] insecurity     Worry: Never true     Inability: Never true   [employer] needs     Medical: No     Non-medical: No   Tobacco Use    Smoking status: Never Smoker    Smokeless tobacco: Never Used   Substance and Sexual Activity    Alcohol use: Yes     Frequency: 2-4 times a month     Drinks per session: 3 or 4     Binge frequency: Never     Comment: social alcohol use    Drug use: No     Comment: History of cocaine use years ago  No current recreational drug use    Sexual activity: Yes     Partners: Female   Lifestyle    Physical activity     Days per week: 2 days     Minutes per session: 40 min    Stress:  Only a little   Relationships    Social connections     Talks on phone: More than three times a week     Gets together: More than three times a week     Attends [employer]alian service: Never     Active member of club or organization: Yes     Attends meetings of clubs or organizations: More than 4 times per year     Relationship status:     Intimate partner violence     Fear of current or ex partner: No     Emotionally abused: No     Physically abused: No     Forced sexual activity: No   Other Topics Concern    Not on file   Social History Narrative    Education: bachelor's degree in engineering    Learning Disabilities: ADHD history    Marital History:     Children: 1 daughter    Living Arrangement: lives in home with wife and daughter    Occupational History: works in sales for [employer] Relationships: good support system    Legal History: none     History: None       Family Psychiatric History:     Family History   Problem Relation Age of Onset    Anxiety disorder Mother     Substance Abuse Brother     Suicidality Neg Hx        History Review: The following portions of the patient's history were reviewed and updated as appropriate: allergies, current medications, past family history, past medical history, past social history, past surgical history and problem list           HPI     Past Medical History:   Diagnosis Date    Bacterial infection of knee joint ([facility] [address] )     CAD (coronary artery disease)     GERD (gastroesophageal reflux disease)     Heart attack ([facility] [address] )     Hyperlipidemia     Hypertension     Seasonal allergies        Past Surgical History:   Procedure Laterality Date    APPENDECTOMY      CORONARY ANGIOPLASTY WITH STENT PLACEMENT      KNEE ARTHROTOMY      SEPTOPLASTY      SHOULDER SURGERY         Current Outpatient Medications   Medication Sig Dispense Refill    Cholecalciferol (VITAMIN D3) 125 MCG (5000 UT) TABS Take 5,000 Units by mouth daily      [START ON 6/5/2021] clonazePAM (KlonoPIN) 1 mg tablet Take 0 5-1 tablets (0 5-1 mg total) by mouth 2 (two) times a day as needed for anxiety To be filled on or after 6/5/21 60 tablet 3    Coenzyme Q10 (COQ-10) 100 MG CAPS Take 1 capsule by mouth 2 (two) times a day      lamoTRIgine (LaMICtal) 200 MG tablet Take 2 tablets (400 mg total) by mouth daily at bedtime 60 tablet 4    lisinopril (ZESTRIL) 5 mg tablet Take 5 mg by mouth      metoprolol succinate (TOPROL-XL) 50 mg 24 hr tablet Take 1 5 tablets by mouth daily      nitroglycerin (NITROSTAT) 0 4 mg SL tablet Place 0 4 mg under the tongue      prasugrel (EFFIENT) tablet Take 1 tablet by mouth daily      rosuvastatin (CRESTOR) 10 MG tablet Take 10 mg by mouth      TURMERIC PO Take by mouth       No current facility-administered medications for this visit           Allergies   Allergen Reactions    Atorvastatin      Tolerates crestor    Cefazolin Rash    Ceftriaxone Rash       Video Exam    Vitals:    04/23/21 1542   Weight: 112 kg (248 lb)   Height: 6' 1" (1 854 m)       Mental Status Evaluation:    Appearance age appropriate, casually dressed   Behavior cooperative, calm   Speech normal rate, normal volume, normal pitch   Mood euthymic   Affect normal range and intensity, appropriate   Thought Processes organized, goal directed   Associations intact associations   Thought Content no overt delusions   Perceptual Disturbances: no auditory hallucinations, no visual hallucinations   Abnormal Thoughts  Risk Potential Suicidal ideation - None  Homicidal ideation - None  Potential for aggression - No   Orientation oriented to person, place, time/date and situation   Memory recent and remote memory grossly intact   Consciousness alert and awake   Attention Span Concentration Span attention span and concentration are age appropriate   Intellect appears to be of average intelligence   Insight intact   Judgement intact   Muscle Strength and  Gait normal muscle strength and normal muscle tone, normal gait and normal balance   Motor activity no abnormal movements   Language no difficulty naming common objects, no difficulty repeating a phrase, no difficulty writing a sentence   Fund of Knowledge adequate knowledge of current events  adequate fund of knowledge regarding past history  adequate fund of knowledge regarding vocabulary    Pain none   Pain Scale 0       Laboratory Results: I have personally reviewed all pertinent laboratory/tests results     CBC AND DIFFERENTIAL  Order: [id number]  (suggestion)  Information displayed in this report will not trend or trigger automated decision support  Ref Range & Units 12/9/20  3:05 PM   Hemoglobin 12 5 - 17 0 g/dL 16 4    Hematocrit 37 0 - 48 0 % 47 3    WBC 4 0 - 10 5 thou/cmm 10 2    RBC 4 00 - 5 40 mill/cmm 5 30    Platelet Count 026 - 350 thou/cmm 249    MPV 7 5 - 11 3 fL 8 2    MCV 80 - 100 fL 89    MCH 27 0 - 36 0 pg 30 9    MCHC 32 0 - 37 0 g/dL 34 6    RDW 12 0 - 16 0 % 13 4    Differential Type   AUTO    Absolute Neutrophils 1 8 - 7 8 thou/cmm 5 8    Absolute Lymphocytes 1 0 - 3 0 thou/cmm 3 3High     Absolute Monocytes 0 3 - 1 0 thou/cmm 0 9    Absolute Eosinophils 0 0 - 0 5 thou/cmm 0 2    Absolute Basophils 0 0 - 0 1 thou/cmm 0 1    Neutrophils  % 56    Lymphocytes  % 33    Monocytes  % 8    Eosinophils  % 2    Basophils  % 1    Specimen Collected: 12/09/20  3:05 PM Last Resulted: 12/09/20 10:36 PM     LIPID PANEL  Order: [id number]  (suggestion)  Information displayed in this report will not trend or trigger automated decision support  Ref Range & Units 12/9/20  3:05 PM   Cholesterol <200 mg/dL 173    Triglyceride <150 mg/dL 151High     Cholesterol, HDL, Direct >40 mg/dL 57    Cholesterol, Non-HDL <160 mg/dL 116    Comment: Note: For NCEP interpretive guidelines please refer to the Laboratory Handbook  Cholesterol, LDL, Calculated <130 mg/dL 86    Comment: LDL Cholesterol was calculated using the Friedewald equation  Direct measurement of LDL is not indicated for this patient based on [facility]'s analytical algorithm for measurement of LDL Cholesterol  CHOL/HDL Ratio   3 04      COMPREHENSIVE METABOLIC PANEL  Order: [id number]  (suggestion)  Information displayed in this report will not trend or trigger automated decision support      Ref Range & Units 12/9/20  3:05 PM   Glucose 65 - 99 mg/dL 80    BUN 7 - 28 mg/dL 13    Creatinine 0 53 - 1 30 mg/dL 1 25    Sodium 135 - 145 mmol/L 141    Potassium 3 5 - 5 2 mmol/L 4 2    Chloride 100 - 109 mmol/L 106    Carbon Dioxide 23 - 31 mmol/L 30    Calcium 8 5 - 10 1 mg/dL 9 6    Alkaline Phosphatase 35 - 120 U/L 75    Albumin 3 5 - 4 8 g/dL 4 2    Bilirubin, Total 0 2 - 1 0 mg/dL 0 4    Comment: Use of this assay is not recommended for patients undergoing treatment with eltrombopag due to the potential for falsely elevated results  Protein, Total 6 3 - 8 3 g/dL 7 8    AST <41 U/L 29    ALT <56 U/L 53    Anion Gap 3 - 11  5    GFR, Calculated >60 mL/min/1 73m2 69              Suicide/Homicide Risk Assessment:    Risk of Harm to Self:  Demographic risk factors include: , male  Historical Risk Factors include: history of anxiety, history of mood disorder  Recent Specific Risk Factors include: diagnosis of mood disorder  Protective Factors: no current suicidal ideation, being a parent, being , compliant with medications, compliant with mental health treatment, connection to own children, effective problem solving skills, responsibilities and duties to others, stable living environment, stable job, supportive family  Weapons: none  The following steps have been taken to ensure weapons are properly secured: not applicable  Based on today's assessment, Mary Kay Yo presents the following risk of harm to self: none    Risk of Harm to Others: The following ratings are based on assessment at the time of the interview  Based on today's assessment, Mary Kay Yo presents the following risk of harm to others: none    The following interventions are recommended: no intervention changes needed     Assessment/Plan:       Diagnoses and all orders for this visit:    Bipolar I disorder, most recent episode depressed, in full remission ([facility] )  -     lamoTRIgine (LaMICtal) 200 MG tablet; Take 2 tablets (400 mg total) by mouth daily at bedtime    CODEY (generalized anxiety disorder)  -     clonazePAM (KlonoPIN) 1 mg tablet;  Take 0 5-1 tablets (0 5-1 mg total) by mouth 2 (two) times a day as needed for anxiety To be filled on or after 6/5/21    ADHD (attention deficit hyperactivity disorder), combined type          Treatment Recommendations/Precautions:    Continue Lamictal 400 mg at bedtime to help with mood stabilization  Continue Klonopin 0 5 mg to 1 mg twice a day as needed to improve anxiety symptoms  Medication management every 4 months  Follows with family physician for yearly physical exam, cardiac issues, hyperlipidemia and hypertension  Aware of 24 hour and weekend coverage for urgent situations accessed by calling [facility] main practice number    Medications Risks/Benefits      Risks, Benefits And Possible Side Effects Of Medications:    Risks, benefits, and possible side effects of medications explained to Pippa Tay including risk of rash related to treatment with Lamictal and risks of misuse, abuse or dependence, sedation and respiratory depression related to treatment with benzodiazepine medications  He verbalizes understanding and agreement for treatment  Controlled Medication Discussion:     Pippa Tay has been filling controlled prescriptions on time as prescribed according to [facility]  Discussed with Pippa Tay the risks of sedation, respiratory depression, impairment of ability to drive and potential for abuse and addiction related to treatment with benzodiazepine medications  He understands risk of treatment with benzodiazepine medications, agrees to not drive if feels impaired and agrees to take medications as prescribed    Psychotherapy Provided:     Individual psychotherapy provided: Yes  Counseling was provided during the session today for 16 minutes  Medications, treatment progress and treatment plan reviewed with Pippa Tay  Goals discussed during in session: maintain control of anxiety and maintain mood stability     Discussed with Pippa Tay coping with COVID-19 issues and occasional anxiety  Coping techniques including exercising, increasing motivation and spending time with family reviewed with Michael Ser  Supportive therapy provided  Treatment Plan:    Completed and signed during the session: Yes - Treatment Plan done but not signed at time of office visit due to:  Plan reviewed in person and verbal consent given due to [place] social distancing     Note Share: This note was shared with patient  I spent 30 minutes with patient today in which greater than 50% of the time was spent in counseling/coordination of care regarding coping with ongoing [address] acknowledges that he has consented to an online visit or consultation  He understands that the online visit is based solely on information provided by him, and that, in the absence of a face-to-face physical evaluation by the physician, the diagnosis he receives is both limited and provisional in terms of accuracy and completeness  This is not intended to replace a full medical face-to-face evaluation by the physician  Jordon Olivas understands and accepts these terms

## 2021-04-23 NOTE — BH TREATMENT PLAN
TREATMENT PLAN (Medication Management Only)        [facility]    Name/Date of Birth/MRN:  Lela Brewer 54 y o  1975 MRN: [id number]  Date of Treatment Plan: April 23, 2021  Diagnosis/Diagnoses:   1  Bipolar I disorder, most recent episode depressed, in full remission ([facility] [address] )    2  CODEY (generalized anxiety disorder)    3  ADHD (attention deficit hyperactivity disorder), combined type      Strengths/Personal Resources for Self-Care: "able to talk to other people"  Area/Areas of need (in own words): "getting out and being active"  1  Long Term Goal:   maintain control of anxiety, maintain mood stability  Target Date: 4 months - 8/23/2021  Person/Persons responsible for completion of goal: Balbir Khalil  2  Short Term Objective (s) - How will we reach this goal?:   A  Provider new recommended medication/dosage changes and/or continue medication(s): continue current medications as prescribed (Lamictal and Klonopin)  B   N/A   C   N/A  Target Date: 4 months - 8/23/2021  Person/Persons Responsible for Completion of Goal: Balbir Khalil   Progress Towards Goals: stable  Treatment Modality: medication management every 4 months  Review due 180 days from date of this plan: 6 months - 10/23/2021  Expected length of service: maintenance unless revised  My Physician/PA/NP and I have developed this plan together and I agree to work on the goals and objectives  I understand the treatment goals that were developed for my treatment    Electronic Signatures: on file (unless signed below)    Graham Stein MD 04/23/21

## 2021-10-19 DIAGNOSIS — F41.1 GAD (GENERALIZED ANXIETY DISORDER): Chronic | ICD-10-CM

## 2021-10-19 DIAGNOSIS — F31.76 BIPOLAR I DISORDER, MOST RECENT EPISODE DEPRESSED, IN FULL REMISSION (HCC): Primary | Chronic | ICD-10-CM

## 2021-10-19 RX ORDER — LAMOTRIGINE 200 MG/1
400 TABLET ORAL
Qty: 60 TABLET | Refills: 4 | Status: SHIPPED | OUTPATIENT
Start: 2021-10-19 | End: 2022-03-14 | Stop reason: SDUPTHER

## 2021-10-19 RX ORDER — CLONAZEPAM 1 MG/1
.5-1 TABLET ORAL 2 TIMES DAILY PRN
Qty: 60 TABLET | Refills: 4 | Status: SHIPPED | OUTPATIENT
Start: 2021-10-19 | End: 2022-03-16 | Stop reason: SDUPTHER

## 2022-03-11 NOTE — PSYCH
Virtual Regular Visit    Verification of patient location:    Patient is located in the following state in which I hold an active license [place]    Assessment/Plan:    Problem List Items Addressed This Visit        Other    Bipolar I disorder, most recent episode depressed, in full remission ([facility] [address] ) - Primary (Chronic)    Relevant Medications    lamoTRIgine (LaMICtal) 200 MG tablet    CODEY (generalized anxiety disorder) (Chronic)    Relevant Medications    clonazePAM (KlonoPIN) 1 mg tablet (Start on 3/18/2022)    ADHD (attention deficit hyperactivity disorder), combined type (Chronic)          Reason for visit is   Chief Complaint   Patient presents with    Medication Management    Follow-up    Virtual Regular Visit      Encounter provider [place] MD    Provider located at [address] [address] [phone]    Recent Visits  No visits were found meeting these conditions  Showing recent visits within past 7 days and meeting all other requirements  Today's Visits  Date Type Provider Dept   03/16/22 Telemedicine Ernestine Gilman MD [facility] 18 today's visits and meeting all other requirements  Future Appointments  No visits were found meeting these conditions  Showing future appointments within next 150 days and meeting all other requirements       The patient was identified by name and date of birth  Madelaine Devlin was informed that this is a telemedicine visit and that the visit is being conducted through [facility] and patient was informed this is a secure, HIPAA-complaint platform  He agrees to proceed     My office door was closed  No one else was in the room  He acknowledged consent and understanding of privacy and security of the video platform  The patient has agreed to participate and understands they can discontinue the visit at any time      [facility]  Subjective:    Dayan Kohler is a 51 y o  male with a history of Bipolar Disorder, Generalized Anxiety Disorder and ADHD  He continues to do fairly well since the last visit  He states that mood has been stable, denies any significant depressive symptoms or manic symptoms  He reports that anxiety symptoms are relatively well controlled  He had to put his 2 dogs to sleep recently - still feels sad about at times "losing a pet is a tough one"  He states that has interviewed recently for a new job "it is more strategic"  He denies any suicidal ideation, intent or plan at present; denies any homicidal ideation, intent or plan at present  He has no auditory hallucinations, denies any visual hallucinations, has no delusional thoughts  He denies any side effects from current psychiatric medications  No rash noted or reported  Jong ROWLEY Appetite Changes and Sleep:     He reports normal sleep, adequate number of sleep hours (8 hours), normal appetite, recent weight gain (7 lbs), fluctuating energy levels    Current Rating Scores:     Current PHQ-9   PHQ-2/9 Depression Screening    Little interest or pleasure in doing things: 1 - several days  Feeling down, depressed, or hopeless: 0 - not at all  Trouble falling or staying asleep, or sleeping too much: 1 - several days  Feeling tired or having little energy: 1 - several days  Poor appetite or overeatin - several days  Feeling bad about yourself - or that you are a failure or have let yourself or your family down: 0 - not at all  Trouble concentrating on things, such as reading the newspaper or watching television: 1 - several days  Moving or speaking so slowly that other people could have noticed   Or the opposite - being so fidgety or restless that you have been moving around a lot more than usual: 1 - several days  Thoughts that you would be better off dead, or of hurting yourself in some way: 0 - not at all  PHQ-9 Score: 6   PHQ-9 Interpretation: Mild depression          Review Of Systems:      Constitutional recent weight gain (7 lbs) and fluctuating energy level   ENT negative   Cardiovascular negative   Respiratory negative   Gastrointestinal negative   Genitourinary negative   Musculoskeletal negative   Integumentary negative   Neurological negative   Endocrine negative   Other Symptoms none, all other systems are negative       Past Psychiatric History: (unchanged information from previous note copied and updated)    Past Inpatient Psychiatric Treatment:   No history of past inpatient psychiatric admissions  Past Outpatient Psychiatric Treatment:    In outpatient treatment at [address] [place] [address] [address] for many years  Past Suicide Attempts: no  Past Violent Behavior: no  Past Psychiatric Medication Trials: Lamictal, Klonopin, Strattera and Adderall XR    Traumatic History: (unchanged information from previous note copied and updated)    Abuse: no history of sexual abuse, no history of physical abuse  Other Traumatic Events: none     Substance Abuse History:    Social History     Substance and Sexual Activity   Alcohol Use Yes    Comment: social alcohol use     Social History     Substance and Sexual Activity   Drug Use No    Comment: History of cocaine use years ago  No current recreational drug use       Social History:    Social History     Socioeconomic History    Marital status: /Civil Union     Spouse name: Not on file    Number of children: 1    Years of education: bachelor's degree    Highest education level: Bachelor's degree (e g , BA, AB, BS)   Occupational History    Occupation: works in sales   Tobacco Use    Smoking status: Never Smoker    Smokeless tobacco: Never Used   Vaping Use    Vaping Use: Never used   Substance and Sexual Activity    Alcohol use: Yes     Comment: social alcohol use    Drug use: No     Comment: History of cocaine use years ago   No current recreational drug use    Sexual activity: Yes     Partners: Female   Other Topics Concern    Not on file   Social History Narrative    Education: bachelor's degree in engineering    Learning Disabilities: ADHD history    Marital History:     Children: 1 daughter    Living Arrangement: lives in home with wife and daughter    Occupational History: works in sales for [employer] [address]: good support system    Legal History: none     History: None     Social Determinants of Health     Financial Resource Strain: Low Risk     Difficulty of Paying Living Expenses: Not hard at all   Food Insecurity: No Food Insecurity    Worried About [address] in the Last Year: Never true   [address] in the Last Year: Never true   Transportation Needs: No Transportation Needs    Lack of Transportation (Medical): No    Lack of Transportation (Non-Medical): No   Physical Activity: Insufficiently Active    Days of Exercise per Week: 3 days    Minutes of Exercise per Session: 20 min   Stress: No Stress Concern Present    Feeling of Stress : Only a little   Social Connections: Moderately Integrated    Frequency of Communication with Friends and Family: More than three times a week    Frequency of Social Gatherings with Friends and Family: More than three times a week    Attends Jehovah's witness Services: Never    Active Member of Clubs or Organizations:  Yes    Attends Club or Organization Meetings: More than 4 times per year    Marital Status:    Intimate Partner Violence: Not At Risk    Fear of Current or Ex-Partner: No    Emotionally Abused: No    Physically Abused: No    Sexually Abused: No   Housing Stability: Low Risk     Unable to Pay for Housing in the Last Year: No    Number of Places Lived in the Last Year: 1    Unstable Housing in the Last Year: No       Family Psychiatric History:     Family History   Problem Relation Age of Onset    Anxiety disorder Mother     Substance Abuse Brother     Suicidality Neg Hx        History Review: The following portions of the patient's history were reviewed and updated as appropriate: allergies, current medications, past family history, past medical history, past social history, past surgical history and problem list     HPI     Past Medical History:   Diagnosis Date    Bacterial infection of knee joint ([facility] [address] )     CAD (coronary artery disease)     GERD (gastroesophageal reflux disease)     Heart attack ([facility] [address] )     Hyperlipidemia     Hypertension     Seasonal allergies        Past Surgical History:   Procedure Laterality Date    APPENDECTOMY      CORONARY ANGIOPLASTY WITH STENT PLACEMENT      KNEE ARTHROTOMY      SEPTOPLASTY      SHOULDER SURGERY         Current Outpatient Medications   Medication Sig Dispense Refill    Cholecalciferol (VITAMIN D3) 125 MCG (5000 UT) TABS Take 5,000 Units by mouth daily      [START ON 3/18/2022] clonazePAM (KlonoPIN) 1 mg tablet Take 0 5-1 tablets (0 5-1 mg total) by mouth 2 (two) times a day as needed for anxiety To be filled on or after 3/18/22 60 tablet 4    Coenzyme Q10 (COQ-10) 100 MG CAPS Take 1 capsule by mouth 2 (two) times a day      lamoTRIgine (LaMICtal) 200 MG tablet Take 2 tablets (400 mg total) by mouth daily at bedtime 60 tablet 4    lisinopril (ZESTRIL) 5 mg tablet Take 5 mg by mouth      metoprolol succinate (TOPROL-XL) 50 mg 24 hr tablet Take 1 5 tablets by mouth daily      nitroglycerin (NITROSTAT) 0 4 mg SL tablet Place 0 4 mg under the tongue      prasugrel (EFFIENT) tablet Take 1 tablet by mouth daily      rosuvastatin (CRESTOR) 10 MG tablet Take 10 mg by mouth      TURMERIC PO Take by mouth      zinc gluconate 50 mg tablet Take 50 mg by mouth daily       No current facility-administered medications for this visit          Allergies   Allergen Reactions    Atorvastatin      Tolerates crestor    Cefazolin Rash    Ceftriaxone Rash       Video Exam    Vitals:    03/16/22 1502 Weight: 116 kg (255 lb)   Height: 6' 1" (1 854 m)       Mental Status Evaluation:    Appearance age appropriate, casually dressed   Behavior cooperative, calm   Speech normal rate, normal volume, normal pitch   Mood euthymic   Affect normal range and intensity, appropriate   Thought Processes organized, goal directed   Associations intact associations   Thought Content no overt delusions   Perceptual Disturbances: no auditory hallucinations, no visual hallucinations   Abnormal Thoughts  Risk Potential Suicidal ideation - None  Homicidal ideation - None  Potential for aggression - No   Orientation oriented to person, place, time/date and situation   Memory recent and remote memory grossly intact   Consciousness alert and awake   Attention Span Concentration Span attention span and concentration are age appropriate   Intellect appears to be of average intelligence   Insight intact   Judgement intact   Muscle Strength and  Gait normal muscle strength and normal muscle tone, normal gait and normal balance   Motor activity no abnormal movements   Language no difficulty naming common objects, no difficulty repeating a phrase, no difficulty writing a sentence   Fund of Knowledge adequate knowledge of current events  adequate fund of knowledge regarding past history  adequate fund of knowledge regarding vocabulary    Pain none   Pain Scale 0       Laboratory Results: I have personally reviewed all pertinent laboratory/tests results    Recent Labs (last 12 months):   No visits with results within 12 Month(s) from this visit  Latest known visit with results is:   No results found for any previous visit         Suicide/Homicide Risk Assessment:    Risk of Harm to Self:  Demographic risk factors include: , male  Historical Risk Factors include: history of anxiety, history of mood disorder  Recent Specific Risk Factors include: diagnosis of mood disorder  Protective Factors: no current suicidal ideation, being a parent, being , compliant with medications, compliant with mental health treatment, connection to own children, responsibilities and duties to others, stable living environment, supportive family  Weapons: none  The following steps have been taken to ensure weapons are properly secured: not applicable  Based on today's assessment, Siri Rosen presents the following risk of harm to self: none    Risk of Harm to Others: The following ratings are based on assessment at the time of the interview  Based on today's assessment, Siri Rosen presents the following risk of harm to others: none    The following interventions are recommended: no intervention changes needed     Assessment/Plan:       Diagnoses and all orders for this visit:    Bipolar I disorder, most recent episode depressed, in full remission ([facility] )  -     lamoTRIgine (LaMICtal) 200 MG tablet; Take 2 tablets (400 mg total) by mouth daily at bedtime    CODEY (generalized anxiety disorder)  -     clonazePAM (KlonoPIN) 1 mg tablet; Take 0 5-1 tablets (0 5-1 mg total) by mouth 2 (two) times a day as needed for anxiety To be filled on or after 3/18/22    ADHD (attention deficit hyperactivity disorder), combined type    Other orders  -     zinc gluconate 50 mg tablet;  Take 50 mg by mouth daily          Treatment Recommendations/Precautions:    Continue Lamictal 400 mg at bedtime to help with mood stabilization  Continue Klonopin 0 5 mg to 1 mg twice a day as needed to improve anxiety symptoms  Medication management every 4 months  Follows with family physician for yearly physical exam, cardiac issues, hyperlipidemia and hypertension  Aware of 24 hour and weekend coverage for urgent situations accessed by calling [facility] main practice number    Medications Risks/Benefits      Risks, Benefits And Possible Side Effects Of Medications:    Risks, benefits, and possible side effects of medications explained to Siri Rosen including risk of rash related to treatment with Lamictal and risks of misuse, abuse or dependence, sedation and respiratory depression related to treatment with benzodiazepine medications  He verbalizes understanding and agreement for treatment  Controlled Medication Discussion:     Siri Rosen has been filling controlled prescriptions on time as prescribed according to [facility]  Discussed with Siri Rosen the risks of sedation, respiratory depression, impairment of ability to drive and potential for abuse and addiction related to treatment with benzodiazepine medications  He understands risk of treatment with benzodiazepine medications, agrees to not drive if feels impaired and agrees to take medications as prescribed    Psychotherapy Provided:     Individual psychotherapy provided: Yes  Counseling was provided during the session today for 16 minutes  Medications, treatment progress and treatment plan reviewed with Siri Rosen  Goals discussed during in session: maintain control of anxiety and maintain mood stability  Discussed with Siri Rosen coping with death of 2 dogs  Coping strategies including exercising, maintain healthy diet and maintain positive attitude reviewed with Siri Rosen  Supportive therapy provided  Treatment Plan:    Completed and signed during the session: Yes - Treatment Plan done but not signed at time of office visit due to:  Plan reviewed by video and verbal consent given due to [place] social distancing     Note Share: This note was shared with patient  I spent 30 minutes with patient today in which greater than 50% of the time was spent in counseling/coordination of care regarding coping with death of 2 dogs    VIRTUAL VISIT [facility] verbally agrees to participate in [employer]   Pt is aware that [employer] could be limited without vital signs or the ability to perform a full hands-on physical exam  Dayan Kohler understands he or the provider may request at any time to terminate the video visit and request the patient to seek care or treatment in person       Naila Esquivel MD

## 2022-03-14 DIAGNOSIS — F31.76 BIPOLAR I DISORDER, MOST RECENT EPISODE DEPRESSED, IN FULL REMISSION (HCC): Chronic | ICD-10-CM

## 2022-03-14 RX ORDER — LAMOTRIGINE 200 MG/1
400 TABLET ORAL
Qty: 60 TABLET | Refills: 0 | Status: SHIPPED | OUTPATIENT
Start: 2022-03-14 | End: 2022-03-16 | Stop reason: SDUPTHER

## 2022-03-16 ENCOUNTER — TELEMEDICINE (OUTPATIENT)
Dept: PSYCHIATRY | Facility: CLINIC | Age: 47
End: 2022-03-16
Payer: COMMERCIAL

## 2022-03-16 VITALS — BODY MASS INDEX: 33.8 KG/M2 | HEIGHT: 73 IN | WEIGHT: 255 LBS

## 2022-03-16 DIAGNOSIS — F90.2 ADHD (ATTENTION DEFICIT HYPERACTIVITY DISORDER), COMBINED TYPE: Chronic | ICD-10-CM

## 2022-03-16 DIAGNOSIS — F31.76 BIPOLAR I DISORDER, MOST RECENT EPISODE DEPRESSED, IN FULL REMISSION (HCC): Primary | Chronic | ICD-10-CM

## 2022-03-16 DIAGNOSIS — F41.1 GAD (GENERALIZED ANXIETY DISORDER): Chronic | ICD-10-CM

## 2022-03-16 PROCEDURE — 99214 OFFICE O/P EST MOD 30 MIN: CPT | Performed by: PSYCHIATRY & NEUROLOGY

## 2022-03-16 RX ORDER — ZINC GLUCONATE 50 MG
50 TABLET ORAL DAILY
COMMUNITY

## 2022-03-16 RX ORDER — CLONAZEPAM 1 MG/1
.5-1 TABLET ORAL 2 TIMES DAILY PRN
Qty: 60 TABLET | Refills: 4 | Status: SHIPPED | OUTPATIENT
Start: 2022-03-18 | End: 2022-08-15

## 2022-03-16 RX ORDER — LAMOTRIGINE 200 MG/1
400 TABLET ORAL
Qty: 60 TABLET | Refills: 4 | Status: SHIPPED | OUTPATIENT
Start: 2022-03-16 | End: 2022-04-11 | Stop reason: SDUPTHER

## 2022-03-16 NOTE — BH TREATMENT PLAN
TREATMENT PLAN (Medication Management Only)        [facility]    Name/Date of Birth/MRN:  Jacky Patino 51 y o  1975 MRN: [id number]  Date of Treatment Plan: March 16, 2022  Diagnosis/Diagnoses:   1  Bipolar I disorder, most recent episode depressed, in full remission ([facility] [address] )    2  CODEY (generalized anxiety disorder)    3  ADHD (attention deficit hyperactivity disorder), combined type      Strengths/Personal Resources for Self-Care: "my creativity"  Area/Areas of need (in own words): "getting more active again"  1  Long Term Goal:   maintain control of anxiety, maintain mood stability, help with ADHD symptoms  Target Date: 4 months - 7/16/2022  Person/Persons responsible for completion of goal: Jaime Govea  2  Short Term Objective (s) - How will we reach this goal?:   A  Provider new recommended medication/dosage changes and/or continue medication(s): continue current medications as prescribed (Lamictal and Klonopin)  B   N/A   C   N/A  Target Date: 4 months - 7/16/2022  Person/Persons Responsible for Completion of Goal: Jaime Govea   Progress Towards Goals: stable  Treatment Modality: medication management every 4 months  Review due 180 days from date of this plan: 6 months - 9/16/2022  Expected length of service: maintenance unless revised  My Physician/PA/NP and I have developed this plan together and I agree to work on the goals and objectives  I understand the treatment goals that were developed for my treatment    Electronic Signatures: on file (unless signed below)    Madison Medeiros MD 03/16/22

## 2022-03-17 ENCOUNTER — TELEPHONE (OUTPATIENT)
Dept: PSYCHIATRY | Facility: CLINIC | Age: 47
End: 2022-03-17

## 2022-03-17 NOTE — TELEPHONE ENCOUNTER
LVM  Last seen 3/16/22  Pt needs a 4 month f/u  Please schedule in first available slot in August (or September)   Previous appt was virtual

## 2022-04-11 DIAGNOSIS — F31.76 BIPOLAR I DISORDER, MOST RECENT EPISODE DEPRESSED, IN FULL REMISSION (HCC): Primary | Chronic | ICD-10-CM

## 2022-04-11 RX ORDER — LAMOTRIGINE 200 MG/1
400 TABLET ORAL
Qty: 60 TABLET | Refills: 0 | Status: SHIPPED | OUTPATIENT
Start: 2022-04-11 | End: 2022-05-11

## 2022-04-11 NOTE — TELEPHONE ENCOUNTER
He stated he did not receive his pill pack  He is requesting at least 1 weeks worth [facility] until he gets the refill  He is stating he is out and did not take one yesterday and is already feeling side effects from not taking

## 2022-04-12 DIAGNOSIS — F31.76 BIPOLAR I DISORDER, MOST RECENT EPISODE DEPRESSED, IN FULL REMISSION (HCC): Primary | Chronic | ICD-10-CM

## 2022-04-12 RX ORDER — LAMOTRIGINE 200 MG/1
400 TABLET ORAL
Qty: 60 TABLET | Refills: 4 | Status: SHIPPED | OUTPATIENT
Start: 2022-04-12 | End: 2022-09-09

## 2022-04-12 RX ORDER — LAMOTRIGINE 200 MG/1
400 TABLET ORAL
Qty: 60 TABLET | Refills: 0 | Status: CANCELLED | OUTPATIENT
Start: 2022-04-12 | End: 2022-05-12

## 2022-04-12 NOTE — TELEPHONE ENCOUNTER
New prescription for Lamictal 400 mg at bedtime was escripted to Jemal  as requested since 30 days renewal sent yesterday to [facility] pharmacy automatically cancelled previous Lamictal prescription at [place] [id number]

## 2022-07-13 ENCOUNTER — OFFICE VISIT (OUTPATIENT)
Dept: URGENT CARE | Facility: MEDICAL CENTER | Age: 47
End: 2022-07-13
Payer: COMMERCIAL

## 2022-07-13 VITALS
SYSTOLIC BLOOD PRESSURE: 138 MMHG | RESPIRATION RATE: 18 BRPM | BODY MASS INDEX: 33.13 KG/M2 | OXYGEN SATURATION: 99 % | DIASTOLIC BLOOD PRESSURE: 82 MMHG | TEMPERATURE: 98.2 F | HEIGHT: 73 IN | WEIGHT: 250 LBS | HEART RATE: 100 BPM

## 2022-07-13 DIAGNOSIS — L50.9 URTICARIA: Primary | ICD-10-CM

## 2022-07-13 PROCEDURE — 99214 OFFICE O/P EST MOD 30 MIN: CPT | Performed by: PHYSICIAN ASSISTANT

## 2022-07-13 RX ORDER — PREDNISONE 10 MG/1
TABLET ORAL
Qty: 21 TABLET | Refills: 0 | Status: SHIPPED | OUTPATIENT
Start: 2022-07-13

## 2022-07-13 RX ORDER — METHYLPREDNISOLONE SODIUM SUCCINATE 125 MG/2ML
125 INJECTION, POWDER, LYOPHILIZED, FOR SOLUTION INTRAMUSCULAR; INTRAVENOUS ONCE
Status: COMPLETED | OUTPATIENT
Start: 2022-07-13 | End: 2022-07-13

## 2022-07-13 RX ADMIN — METHYLPREDNISOLONE SODIUM SUCCINATE 125 MG: 125 INJECTION, POWDER, LYOPHILIZED, FOR SOLUTION INTRAMUSCULAR; INTRAVENOUS at 08:53

## 2022-07-13 NOTE — PROGRESS NOTES
[facility]        NAME: Leigh Cordero is a 49 y o  male  : 1975    MRN: [id number]  DATE: 2022  TIME: 10:23 AM    Assessment and Plan   Urticaria [L50 9]  1  Urticaria  methylPREDNISolone sodium succinate (Solu-MEDROL) injection 125 mg    predniSONE 10 mg tablet         Patient Instructions       Follow up with PCP in 3-5 days  Proceed to  ER if symptoms worsen  Chief Complaint     Chief Complaint   Patient presents with    Rash     Patient states he woke up with hives yesterday that has spread from abdomen to b/l extremities and is worse on his hands; patient states he was stung by a hornets nest a week ago and also ate walnuts yesterday; unsure what caused the hives          History of Present Illness       Patient for evaluation of persistent highs which have been spreading since yesterday morning  Patient did take Benadryl throughout the day yesterday and the hive subsided  Today the start up again and have been getting progressively worse  He has been getting some swelling in the hands as well  Patient did have almond milk on Monday night and states he does have a history of tingling in the throat with eating all meds in the past   Patient also had some cashews last night  Review of Systems   Review of Systems   Constitutional: Negative  HENT: Negative for sore throat, trouble swallowing and voice change  Eyes: Negative  Respiratory: Negative  Cardiovascular: Negative  Gastrointestinal: Negative  Musculoskeletal: Negative  Skin: Positive for rash  Neurological: Negative            Current Medications       Current Outpatient Medications:     predniSONE 10 mg tablet, Six tablets day 1; 5 tablets day 2; 4 tablets day 3; 3 tablets day 4; 2 tablets day 5; and 1 tablet day 6, Disp: 21 tablet, Rfl: 0    Cholecalciferol (VITAMIN D3) 125 MCG (5000 UT) TABS, Take 5,000 Units by mouth daily, Disp: , Rfl:     clonazePAM (KlonoPIN) 1 mg tablet, Take 0 5-1 tablets (0 5-1 mg total) by mouth 2 (two) times a day as needed for anxiety To be filled on or after 3/18/22, Disp: 60 tablet, Rfl: 4    Coenzyme Q10 (COQ-10) 100 MG CAPS, Take 1 capsule by mouth 2 (two) times a day, Disp: , Rfl:     lamoTRIgine (LaMICtal) 200 MG tablet, Take 2 tablets (400 mg total) by mouth daily at bedtime, Disp: 60 tablet, Rfl: 0    lamoTRIgine (LaMICtal) 200 MG tablet, Take 2 tablets (400 mg total) by mouth daily at bedtime, Disp: 60 tablet, Rfl: 4    lisinopril (ZESTRIL) 5 mg tablet, Take 5 mg by mouth, Disp: , Rfl:     metoprolol succinate (TOPROL-XL) 50 mg 24 hr tablet, Take 1 5 tablets by mouth daily, Disp: , Rfl:     nitroglycerin (NITROSTAT) 0 4 mg SL tablet, Place 0 4 mg under the tongue, Disp: , Rfl:     prasugrel (EFFIENT) tablet, Take 1 tablet by mouth daily, Disp: , Rfl:     rosuvastatin (CRESTOR) 10 MG tablet, Take 10 mg by mouth, Disp: , Rfl:     TURMERIC PO, Take by mouth, Disp: , Rfl:     zinc gluconate 50 mg tablet, Take 50 mg by mouth daily, Disp: , Rfl:   No current facility-administered medications for this visit      Current Allergies     Allergies as of 07/13/2022 - Reviewed 07/13/2022   Allergen Reaction Noted    Atorvastatin  01/24/2017    Cefazolin Rash 07/01/2015    Ceftriaxone Rash 07/01/2015            The following portions of the patient's history were reviewed and updated as appropriate: allergies, current medications, past family history, past medical history, past social history, past surgical history and problem list      Past Medical History:   Diagnosis Date    [facility] joint [facility] [address] [facility]     CAD (coronary artery disease)     GERD (gastroesophageal reflux disease)     Heart attack ([facility] [address] [facility]     Hyperlipidemia     Hypertension     Seasonal allergies        Past Surgical History:   Procedure Laterality Date    APPENDECTOMY      CORONARY ANGIOPLASTY WITH STENT PLACEMENT      KNEE ARTHROTOMY      SEPTOPLASTY      SHOULDER SURGERY Family History   Problem Relation Age of Onset    Anxiety disorder Mother     Substance Abuse Brother     Suicidality Neg Hx          Medications have been verified  Objective   /82   Pulse 100   Temp 98 2 °F (36 8 °C)   Resp 18   Ht 6' 1" (1 854 m)   Wt 113 kg (250 lb)   SpO2 99%   BMI 32 98 kg/m²   No LMP for male patient  Physical Exam     Physical Exam  Vitals and nursing note reviewed  Constitutional:       General: He is not in acute distress  Appearance: Normal appearance  He is well-developed  He is not ill-appearing, toxic-appearing or diaphoretic  HENT:      Head: Normocephalic and atraumatic  Nose: Nose normal  No congestion or rhinorrhea  Mouth/Throat:      Mouth: Mucous membranes are moist       Pharynx: Oropharynx is clear  No oropharyngeal exudate or posterior oropharyngeal erythema  Eyes:      General:         Right eye: No discharge  Left eye: No discharge  Extraocular Movements: Extraocular movements intact  Conjunctiva/sclera: Conjunctivae normal       Pupils: Pupils are equal, round, and reactive to light  Cardiovascular:      Rate and Rhythm: Normal rate and regular rhythm  Heart sounds: Normal heart sounds  No murmur heard  Pulmonary:      Effort: Pulmonary effort is normal  No respiratory distress  Breath sounds: Normal breath sounds  No stridor  No wheezing, rhonchi or rales  Musculoskeletal:      Cervical back: Normal range of motion and neck supple  Right lower leg: No edema  Left lower leg: No edema  Skin:     General: Skin is warm and dry  Findings: Rash (Urticarial rash all over except face) present  Neurological:      General: No focal deficit present  Mental Status: He is alert and oriented to person, place, and time  Psychiatric:         Mood and Affect: Mood normal          Behavior: Behavior normal          Thought Content:  Thought content normal          Judgment: Judgment normal        09:09 Rash slightly fading;  Pt feeling a little lightheaded after the injection which felt better after lying down  09:22 Rash fading and hand swelling decreased  09:31 Rash continues to fade and swelling decreased

## 2022-07-13 NOTE — PATIENT INSTRUCTIONS
Continue Benadryl as directed      May take over-the-counter Pepcid as directed    Take the oral prednisone as directed    Call 911 if you have any worsening symptoms    Go to emergency room if her symptoms are worsening    Follow-up primary care physician in 24-48 hours if symptoms persist    Avoid tree nuts

## 2022-08-18 DIAGNOSIS — F31.76 BIPOLAR I DISORDER, MOST RECENT EPISODE DEPRESSED, IN FULL REMISSION (HCC): Primary | Chronic | ICD-10-CM

## 2022-08-18 RX ORDER — LAMOTRIGINE 200 MG/1
400 TABLET ORAL
Qty: 60 TABLET | Refills: 0 | Status: SHIPPED | OUTPATIENT
Start: 2022-08-18 | End: 2022-09-19

## 2022-08-29 DIAGNOSIS — F41.1 GAD (GENERALIZED ANXIETY DISORDER): Primary | Chronic | ICD-10-CM

## 2022-08-29 RX ORDER — CLONAZEPAM 1 MG/1
.5-1 TABLET ORAL 2 TIMES DAILY PRN
Qty: 60 TABLET | Refills: 0 | Status: SHIPPED | OUTPATIENT
Start: 2022-08-29 | End: 2022-10-13 | Stop reason: SDUPTHER

## 2022-09-07 ENCOUNTER — TELEPHONE (OUTPATIENT)
Dept: PSYCHIATRY | Facility: CLINIC | Age: 47
End: 2022-09-07

## 2022-09-07 NOTE — TELEPHONE ENCOUNTER
Called patient to confirm appt on 9/9/22  Patient will be out of town and will not be able to connect virtually [place]  Patients appt was rescheduled for 10/3/22 at 330pm virtually

## 2022-09-19 DIAGNOSIS — F31.76 BIPOLAR I DISORDER, MOST RECENT EPISODE DEPRESSED, IN FULL REMISSION (HCC): Primary | Chronic | ICD-10-CM

## 2022-09-19 RX ORDER — LAMOTRIGINE 200 MG/1
400 TABLET ORAL
Qty: 60 TABLET | Refills: 0 | Status: SHIPPED | OUTPATIENT
Start: 2022-09-19 | End: 2022-10-18

## 2022-09-23 ENCOUNTER — TELEPHONE (OUTPATIENT)
Dept: PSYCHIATRY | Facility: CLINIC | Age: 47
End: 2022-09-23

## 2022-09-23 NOTE — TELEPHONE ENCOUNTER
Called patient and lvm requesting to call the office back and reschedule appt that was cancelled on 10/3/22 due to provider covering PHP and the office being closed at 3pm  Please schedule patient for next available appt on the week of 10/3 or when the patient is available

## 2022-10-12 NOTE — TELEPHONE ENCOUNTER
Called and lvm for both patient and spouse for patient to call the office back and schedule a follow up  Two past appts were cancelled  Please schedule for next available opening

## 2022-10-13 DIAGNOSIS — F41.1 GAD (GENERALIZED ANXIETY DISORDER): Chronic | ICD-10-CM

## 2022-10-13 RX ORDER — CLONAZEPAM 1 MG/1
.5-1 TABLET ORAL 2 TIMES DAILY PRN
Qty: 60 TABLET | Refills: 0 | Status: SHIPPED | OUTPATIENT
Start: 2022-10-13 | End: 2022-11-12

## 2022-10-13 NOTE — TELEPHONE ENCOUNTER
Medication Refill Request     Name of Medication Clonazepam  Dose/Frequency Take 0 5 -1 (0 5 mg -1 mg total)by mouth 2 times a day as needed for anxiety  Quantity 60  Verified pharmacy   [x]  Verified ordering Provider   [x]  Does patient have enough for the next 3 days? Yes [] No [x]  Does patient have a follow-up appointment scheduled?  Yes [x] No []   If so when is appointment: 11/25/2022

## 2022-10-18 DIAGNOSIS — F31.76 BIPOLAR I DISORDER, MOST RECENT EPISODE DEPRESSED, IN FULL REMISSION (HCC): Primary | Chronic | ICD-10-CM

## 2022-10-18 RX ORDER — LAMOTRIGINE 200 MG/1
400 TABLET ORAL
Qty: 60 TABLET | Refills: 1 | Status: SHIPPED | OUTPATIENT
Start: 2022-10-18 | End: 2022-12-17

## 2022-11-18 NOTE — PSYCH
Virtual Regular Visit    Verification of patient location:    Patient is located in the following state in which I hold an active license [place]    Assessment/Plan:    Problem List Items Addressed This Visit        Other    Bipolar I disorder, most recent episode depressed, mild ([facility] [address] ) - Primary (Chronic)    Relevant Medications    lamoTRIgine (LaMICtal) 200 MG tablet    CODEY (generalized anxiety disorder) (Chronic)    Relevant Medications    clonazePAM (KlonoPIN) 1 mg tablet    ADHD (attention deficit hyperactivity disorder), combined type (Chronic)    Bereavement       Reason for visit is   Chief Complaint   Patient presents with   • Medication Management   • Follow-up   • Virtual Regular Visit      Encounter provider Liss Lipscomb MD    Provider located at [address] [place] [id number] [phone]    Recent Visits  No visits were found meeting these conditions  Showing recent visits within past 7 days and meeting all other requirements  Today's Visits  Date Type Provider Dept   11/25/22 Telemedicine Avinash Charles MD [facility] today's visits and meeting all other requirements  Future Appointments  No visits were found meeting these conditions  Showing future appointments within next 150 days and meeting all other requirements       The patient was identified by name and date of birth  Nathan Goff was informed that this is a telemedicine visit and that the visit is being conducted through the [facility] [employer]  He agrees to proceed     My office door was closed  No one else was in the room  He acknowledged consent and understanding of privacy and security of the video platform  The patient has agreed to participate and understands they can discontinue the visit at any time  Patient is aware this is a billable service       Subjective:    Nathan Goff is a 49 y o  male with a history of Bipolar Disorder, Generalized Anxiety Disorder and ADHD  He has decompensated slightly for the last several weeks due to multiple recent stressors  He reports some depression and rates mood as 6 on a scale of 1 (best mood) to 10 (worst mood)  He also reports increased anxiety symptoms  His father just passed away on  in his sleep, also his 13year-old dog just   He had to take time off work since 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 "my mom relies on me for various things"  He feels that it has been more difficulty for him to concentrate recently, also often experiences restlessness  He denies any suicidal ideation, intent or plan at present; denies any homicidal ideation, intent or plan at present  He has no auditory hallucinations, denies any visual hallucinations, has no delusional thoughts  He denies any side effects from current psychiatric medications  No rash noted or reported  Deborah Sequin HPI ROS Appetite Changes and Sleep:     He reports decreased sleep, decrease in number of sleep hours (6 hours), normal appetite, recent weight loss (10 lbs), low energy    Current Rating Scores:     Current PHQ-9   PHQ-2/9 Depression Screening    Little interest or pleasure in doing things: 2 - more than half the days  Feeling down, depressed, or hopeless: 2 - more than half the days  Trouble falling or staying asleep, or sleeping too much: 2 - more than half the days  Feeling tired or having little energy: 3 - nearly every day  Poor appetite or overeatin - more than half the days  Feeling bad about yourself - or that you are a failure or have let yourself or your family down: 0 - not at all  Trouble concentrating on things, such as reading the newspaper or watching television: 2 - more than half the days  Moving or speaking so slowly that other people could have noticed   Or the opposite - being so fidgety or restless that you have been moving around a lot more than usual: 3 - nearly every day  Thoughts that you would be better off dead, or of hurting yourself in some way: 0 - not at all  PHQ-9 Score: 16   PHQ-9 Interpretation: Moderately severe depression        Current PHQ-9 score is increased from 6 at the last visit)  Review Of Systems:      Constitutional low energy and recent weight loss (10 lbs)   ENT negative   Cardiovascular negative   Respiratory negative   Gastrointestinal negative   Genitourinary negative   Musculoskeletal arm pain   Integumentary negative   Neurological negative   Endocrine negative   Other Symptoms none, all other systems are negative       Past Psychiatric History: (unchanged information from previous note copied and updated)    Past Inpatient Psychiatric Treatment:   No history of past inpatient psychiatric admissions  Past Outpatient Psychiatric Treatment:    In outpatient treatment at [address] [place] [address] E for many years  Past Suicide Attempts: no  Past Violent Behavior: no  Past Psychiatric Medication Trials: Lamictal, Paxil, Klonopin, Strattera and Adderall XR    Traumatic History: (unchanged information from previous note copied and updated)    Abuse: no history of sexual abuse, no history of physical abuse  Other Traumatic Events: none     Substance Abuse History:    Social History     Substance and Sexual Activity   Alcohol Use Yes    Comment: social alcohol use     Social History     Substance and Sexual Activity   Drug Use No    Comment: History of cocaine use years ago  No current recreational drug use       Social History:    Social History     Socioeconomic History   • Marital status: /Civil Union     Spouse name: Not on file   • Number of children: 1   • Years of education: bachelor's degree   • Highest education level:  Bachelor's degree (e g , BA, AB, BS)   Occupational History   • Occupation: works in sales   Tobacco Use   • Smoking status: Never   • Smokeless tobacco: Never   Vaping Use   • Vaping Use: Never used   Substance and Sexual Activity   • Alcohol use: Yes     Comment: social alcohol use   • Drug use: No     Comment: History of cocaine use years ago  No current recreational drug use   • Sexual activity: Yes     Partners: Female   Other Topics Concern   • Not on file   Social History Narrative    Education: bachelor's degree in engineering    Learning Disabilities: ADHD history    Marital History:     Children: 1 daughter    Living Arrangement: lives in home with wife and daughter    Occupational History: works in sales for [employer]: good support system    Legal History: none     History: None     Social Determinants of Health     Financial Resource Strain: Low Risk    • Difficulty of Paying Living Expenses: Not hard at all   Food Insecurity: No Food Insecurity   • Worried About [address] in the Last Year: Never true   • [address] in the Last Year: Never true   Transportation Needs: No Transportation Needs   • Lack of Transportation (Medical): No   • Lack of Transportation (Non-Medical): No   Physical Activity: Inactive   • Days of Exercise per Week: 0 days   • Minutes of Exercise per Session: 0 min   Stress: No Stress Concern Present   • Feeling of Stress : Only a little   Social Connections: Moderately Integrated   • Frequency of Communication with Friends and Family: More than three times a week   • Frequency of Social Gatherings with Friends and Family: More than three times a week   • Attends [employer] Services: Never   • Active Member of Clubs or Organizations:  Yes   • Attends Club or Organization Meetings: More than 4 times per year   • Marital Status:    Intimate Partner Violence: Not At Risk   • Fear of Current or Ex-Partner: No   • Emotionally Abused: No   • Physically Abused: No   • Sexually Abused: No   Housing Stability: Low Risk    • Unable to Pay for Housing in the Last Year: No   • Number of Places Lived in the Last Year: 1   • Unstable Housing in the Last Year: No       Family Psychiatric History:     Family History   Problem Relation Age of Onset   • Anxiety disorder Mother    • Substance Abuse Brother    • Suicidality Neg Hx        History Review: The following portions of the patient's history were reviewed and updated as appropriate: allergies, current medications, past family history, past medical history, past social history, past surgical history and problem list           HPI     Past Medical History:   Diagnosis Date   • Bacterial infection of knee joint ([facility] )    • CAD (coronary artery disease)    • Cellulitis    • GERD (gastroesophageal reflux disease)    • Heart attack ([facility] )    • Hyperlipidemia    • Hypertension    • Seasonal allergies        Past Surgical History:   Procedure Laterality Date   • APPENDECTOMY     • CORONARY ANGIOPLASTY WITH STENT PLACEMENT     • KNEE ARTHROTOMY     • SEPTOPLASTY     • SHOULDER SURGERY         Current Outpatient Medications   Medication Sig Dispense Refill   • albuterol (PROVENTIL HFA,VENTOLIN HFA) 90 mcg/act inhaler INHALE 1-2 PUFFS EVERY 4-6 HOURS AS NEEDED FOR WHEEZING OR SHORTNESS OF BREATH     • Cholecalciferol (VITAMIN D3) 125 MCG (5000 UT) TABS Take 5,000 Units by mouth daily     • clonazePAM (KlonoPIN) 1 mg tablet Take 0 5-1 tablets (0 5-1 mg total) by mouth 2 (two) times a day as needed for anxiety 60 tablet 4   • Coenzyme Q10 (COQ-10) 100 MG CAPS Take 1 capsule by mouth 2 (two) times a day     • EPINEPHrine (EPIPEN) 0 3 mg/0 3 mL SOAJ USE AS DIRECTED AS NEEDED     • lamoTRIgine (LaMICtal) 200 MG tablet Take 2 tablets (400 mg total) by mouth daily at bedtime 60 tablet 4   • lisinopril (ZESTRIL) 5 mg tablet Take 5 mg by mouth     • metoprolol succinate (TOPROL-XL) 50 mg 24 hr tablet Take 1 5 tablets by mouth daily     • nitroglycerin (NITROSTAT) 0 4 mg SL tablet Place 0 4 mg under the tongue     • prasugrel (EFFIENT) tablet Take 1 tablet by mouth daily     • rosuvastatin (CRESTOR) 10 MG tablet Take 10 mg by mouth       No current facility-administered medications for this visit          Allergies   Allergen Reactions   • [place] (Diagnostic) - Food Allergy Other (See Comments)   • Atorvastatin      Tolerates crestor   • Nuts - Food Allergy Hives   • [place] Nut Extract Skin Test - Food Allergy Other (See Comments)   • Cefazolin Rash   • Ceftriaxone Rash       Video Exam    Vitals:    11/25/22 1501   Weight: 111 kg (245 lb)   Height: 6' 1" (1 854 m)       Mental Status Evaluation:    Appearance age appropriate, casually dressed   Behavior cooperative, appears anxious   Speech normal rate, normal volume, normal pitch   Mood depressed, anxious   Affect constricted   Thought Processes organized, goal directed   Associations intact associations   Thought Content no overt delusions   Perceptual Disturbances: no auditory hallucinations, no visual hallucinations   Abnormal Thoughts  Risk Potential Suicidal ideation - None  Homicidal ideation - None   Potential for aggression - No   Orientation oriented to person, place, time/date and situation   Memory recent and remote memory grossly intact   Consciousness alert and awake   Attention Span Concentration Span decreased attention span  decreased concentration   Intellect appears to be of average intelligence   Insight intact   Judgement intact   Muscle Strength and  Gait normal muscle strength and normal muscle tone, normal gait and normal balance   Motor activity no abnormal movements   Language no difficulty naming common objects, no difficulty repeating a phrase, no difficulty writing a sentence   Fund of Knowledge adequate knowledge of current events  adequate fund of knowledge regarding past history  adequate fund of knowledge regarding vocabulary    Pain moderate   Pain Scale 7       Laboratory Results: I have personally reviewed all pertinent laboratory/tests results    CBC AND DIFFERENTIAL  Order: [id number]   Ref Range & Units 4/22/22  4:47 AM   Hemoglobin 12 5 - 17 0 g/dL 15 3    Hematocrit 37 0 - 48 0 % 44 8    WBC 4 0 - 10 5 thou/cmm 8 1    RBC 4 00 - 5 40 mill/cmm 5 20    Platelet Count 660 - 350 thou/cmm 190    MPV 7 5 - 11 3 fL 7 3 Low     MCV 80 - 100 fL 86    MCH 27 0 - 36 0 pg 29 5    MCHC 32 0 - 37 0 g/dL 34 2    RDW 12 0 - 16 0 % 13 9    Differential Type  AUTO    Absolute Neutrophils 1 8 - 7 8 thou/cmm 6 9    Absolute Lymphocytes 1 0 - 3 0 thou/cmm 0 6 Low     Absolute Monocytes 0 3 - 1 0 thou/cmm 0 5    Absolute Eosinophils 0 0 - 0 5 thou/cmm 0 1    Absolute Basophils 0 0 - 0 1 thou/cmm 0 0    Neutrophils % 85    Lymphocytes % 7    Monocytes % 7    Eosinophils % 1    Basophils % 0      Contains abnormal data COMPREHENSIVE METABOLIC PANEL  Order: [id number]   Ref Range & Units 4/21/22  3:05 PM   Glucose 65 - 99 mg/dL 100 High     BUN 7 - 28 mg/dL 19    Creatinine 0 53 - 1 30 mg/dL 1 22    Sodium 135 - 145 mmol/L 137    Potassium 3 5 - 5 2 mmol/L 4 3    Chloride 100 - 109 mmol/L 104    Carbon Dioxide 21 - 31 mmol/L 25    Calcium 8 5 - 10 1 mg/dL 9 4    Alkaline Phosphatase 35 - 120 U/L 60    Albumin 3 5 - 5 7 g/dL 4 4    Bilirubin, Total 0 2 - 1 0 mg/dL 0 6    Comment: Eltrombopag and its metabolites may interfere with this assay causing erroneously high patient results     Protein, Total 6 3 - 8 3 g/dL 7 7    AST <41 U/L 39    ALT <56 U/L 51    Anion Gap 3 - 11 8    eGFRcr >59 74    eGFRcr Comment  Interpretive information: calculated GFR        Suicide/Homicide Risk Assessment:    Risk of Harm to Self:  Demographic risk factors include: , male  Historical Risk Factors include: history of anxiety, history of mood disorder  Recent Specific Risk Factors include: diagnosis of mood disorder, current depressive symptoms, current anxiety symptoms, recent losses (father, dog)  Protective Factors: no current suicidal ideation, being a parent, being , compliant with medications, compliant with mental health treatment, connection to own children, responsibilities and duties to others, stable living environment, stable job, supportive family  Weapons: none  The following steps have been taken to ensure weapons are properly secured: not applicable  Based on today's assessment, Agata Zaragoza presents the following risk of harm to self: minimal    Risk of Harm to Others: The following ratings are based on assessment at the time of the interview  Based on today's assessment, Agata Zaragoza presents the following risk of harm to others: none    The following interventions are recommended: no intervention changes needed     Assessment/Plan:       Diagnoses and all orders for this visit:    Bipolar I disorder, most recent episode depressed, mild ([facility])  -     lamoTRIgine (LaMICtal) 200 MG tablet; Take 2 tablets (400 mg total) by mouth daily at bedtime    CODEY (generalized anxiety disorder)  -     clonazePAM (KlonoPIN) 1 mg tablet;  Take 0 5-1 tablets (0 5-1 mg total) by mouth 2 (two) times a day as needed for anxiety    ADHD (attention deficit hyperactivity disorder), combined type    Bereavement    Other orders  -     albuterol (PROVENTIL HFA,VENTOLIN HFA) 90 mcg/act inhaler; INHALE 1-2 PUFFS EVERY 4-6 HOURS AS NEEDED FOR WHEEZING OR SHORTNESS OF BREATH  -     EPINEPHrine (EPIPEN) 0 3 mg/0 3 mL SOAJ; USE AS DIRECTED AS NEEDED          Treatment Recommendations/Precautions:    Continue Lamictal 400 mg at bedtime to help with mood stabilization  Continue Klonopin 0 5 mg to 1 mg twice a day as needed to improve anxiety symptoms  He is considering restarting Strattera or trying Clonidine - wants to talk to his cardiologist mary regarding recommendations  Consider addition of Zoloft to help with depressive symptoms - he also wants to discuss it first with his cardiologist  Medication management every 2 months  Follows with family physician for yearly physical exam, cardiac issues, hyperlipidemia and hypertension  Aware of 24 hour and weekend coverage for urgent situations accessed by calling [facility] main practice number    Medications Risks/Benefits      Risks, Benefits And Possible Side Effects Of Medications:    Risks, benefits, and possible side effects of medications explained to Isrrael including risk of rash related to treatment with Lamictal and risks of misuse, abuse or dependence, sedation and respiratory depression related to treatment with benzodiazepine medications  He verbalizes understanding and agreement for treatment  Controlled Medication Discussion:     Isrrael has been filling controlled prescriptions on time as prescribed according to [facility]  Discussed with Isrrael the risks of sedation, respiratory depression, impairment of ability to drive and potential for abuse and addiction related to treatment with benzodiazepine medications  He understands risk of treatment with benzodiazepine medications, agrees to not drive if feels impaired and agrees to take medications as prescribed    Psychotherapy Provided:     Individual psychotherapy provided: Yes  Counseling was provided during the session today for 16 minutes  Medications, treatment progress and treatment plan reviewed with Maikolbrittnee  Medication education provided to Isrrael  Goals discussed during in session: improve control of anxiety, improve control of depression, maintain mood stability and control ADHD symptoms  Discussed with Isrrael coping with death of his dog, father's death, ongoing anxiety and chronic mental illness  Coping mechanisms including exercising and reducing time spent worrying reviewed with Isrrael  Supportive therapy provided  Treatment Plan:    Completed and signed during the session: Yes - Treatment Plan done but not signed at time of office visit due to:  Plan reviewed by video and verbal consent given due to [place] social seniaancing     Note Share: This note was shared with patient      I spent 35 minutes with patient today in which greater than 50% of the time was spent in counseling/coordination of care regarding coping with father's death and death of his dog    Visit Time    Visit Start Time: 3:04 PM  Visit Stop Time: 3:39 PM  Total Visit Duration: 35 minutes    Liss Lipscomb MD

## 2022-11-25 ENCOUNTER — TELEMEDICINE (OUTPATIENT)
Dept: PSYCHIATRY | Facility: CLINIC | Age: 47
End: 2022-11-25

## 2022-11-25 VITALS — HEIGHT: 73 IN | WEIGHT: 245 LBS | BODY MASS INDEX: 32.47 KG/M2

## 2022-11-25 DIAGNOSIS — F41.1 GAD (GENERALIZED ANXIETY DISORDER): Chronic | ICD-10-CM

## 2022-11-25 DIAGNOSIS — F90.2 ADHD (ATTENTION DEFICIT HYPERACTIVITY DISORDER), COMBINED TYPE: Chronic | ICD-10-CM

## 2022-11-25 DIAGNOSIS — F31.31 BIPOLAR I DISORDER, MOST RECENT EPISODE DEPRESSED, MILD (HCC): Primary | Chronic | ICD-10-CM

## 2022-11-25 DIAGNOSIS — Z63.4 BEREAVEMENT: ICD-10-CM

## 2022-11-25 PROBLEM — L03.113 CELLULITIS OF RIGHT UPPER EXTREMITY: Status: ACTIVE | Noted: 2022-04-21

## 2022-11-25 RX ORDER — EPINEPHRINE 0.3 MG/.3ML
INJECTION SUBCUTANEOUS
COMMUNITY
Start: 2022-10-11

## 2022-11-25 RX ORDER — ALBUTEROL SULFATE 90 UG/1
AEROSOL, METERED RESPIRATORY (INHALATION)
COMMUNITY
Start: 2022-11-14

## 2022-11-25 RX ORDER — CLONAZEPAM 1 MG/1
.5-1 TABLET ORAL 2 TIMES DAILY PRN
Qty: 60 TABLET | Refills: 4 | Status: SHIPPED | OUTPATIENT
Start: 2022-11-25 | End: 2023-04-24

## 2022-11-25 RX ORDER — LAMOTRIGINE 200 MG/1
400 TABLET ORAL
Qty: 60 TABLET | Refills: 4 | Status: SHIPPED | OUTPATIENT
Start: 2022-11-25 | End: 2023-04-24

## 2022-11-25 SDOH — SOCIAL STABILITY - SOCIAL INSECURITY: DISSAPEARANCE AND DEATH OF FAMILY MEMBER: Z63.4

## 2022-11-25 NOTE — BH TREATMENT PLAN
TREATMENT PLAN (Medication Management Only)        [facility]    Name/Date of Birth/MRN:  Bennett Kothari 49 y o  1975 MRN: [id number]  Date of Treatment Plan: November 25, 2022  Diagnosis/Diagnoses:   1  Bipolar I disorder, most recent episode depressed, mild ([address] )    2  CODEY (generalized anxiety disorder)    3  ADHD (attention deficit hyperactivity disorder), combined type    4  Bereavement      Strengths/Personal Resources for Self-Care: "creativity"  Area/Areas of need (in own words): "my anxiety and depression"  1  Long Term Goal:   alleviate anxiety, improve depression, control ADHD symptoms  Target Date: 2 months - 1/25/2023  Person/Persons responsible for completion of goal: Keron Amaral  2  Short Term Objective (s) - How will we reach this goal?:   A  Provider new recommended medication/dosage changes and/or continue medication(s): continue current medications as prescribed (Lamictal and Klonopin)  B   N/A   C   N/A  Target Date: 2 months - 1/25/2023  Person/Persons Responsible for Completion of Goal: Keron Amaral   Progress Towards Goals: regressed  Treatment Modality: medication management every 2 months  Review due 180 days from date of this plan: 6 months - 5/25/2023  Expected length of service: ongoing treatment unless revised  My Physician/PA/NP and I have developed this plan together and I agree to work on the goals and objectives  I understand the treatment goals that were developed for my treatment    Electronic Signatures: on file (unless signed below)    Peyton Robbins MD 11/25/22

## 2022-12-12 ENCOUNTER — TELEPHONE (OUTPATIENT)
Dept: PSYCHIATRY | Facility: CLINIC | Age: 47
End: 2022-12-12

## 2022-12-12 DIAGNOSIS — F90.2 ADHD (ATTENTION DEFICIT HYPERACTIVITY DISORDER), COMBINED TYPE: Primary | Chronic | ICD-10-CM

## 2022-12-13 RX ORDER — ATOMOXETINE 40 MG/1
40 CAPSULE ORAL DAILY
Qty: 30 CAPSULE | Refills: 1 | Status: SHIPPED | OUTPATIENT
Start: 2022-12-13 | End: 2023-02-11

## 2022-12-13 NOTE — TELEPHONE ENCOUNTER
Strattera 40 mg daily (as discussed during the last appointment on 11/5/22) was escripted for 30 days with 1 RF to [facility] in [address] [place] [address]

## 2022-12-13 NOTE — TELEPHONE ENCOUNTER
Patient called and left voice mail for the office stating he discussed additional medication with provider during last visit  He stated he would like to try and take Strattera  He asked for script to be sent to [facility] in [address] [place] [address] for now and he will see how it works for him  Called patient back and left voice mail to inform message was received and to schedule an appt in March  Asked to call the office back when convenient

## 2022-12-15 NOTE — TELEPHONE ENCOUNTER
Called and left voice mail for patient to inform requested medication was sent into requested pharmacy  Again asked that patient call the office back to schedule a follow up appt in March

## 2023-01-24 NOTE — PSYCH
Virtual Regular Visit    Verification of patient location:    Patient is located in the following state in which I hold an active license [place]    Assessment/Plan:    Problem List Items Addressed This Visit        Other    Bipolar I disorder, most recent episode depressed, mild (HCC) - Primary (Chronic)    Relevant Medications    atoMOXetine (STRATTERA) 60 mg capsule    CODEY (generalized anxiety disorder) (Chronic)    Relevant Medications    atoMOXetine (STRATTERA) 60 mg capsule    ADHD (attention deficit hyperactivity disorder), combined type (Chronic)    Relevant Medications    atoMOXetine (STRATTERA) 60 mg capsule    Bereavement       Reason for visit is   Chief Complaint   Patient presents with   • Medication Management   • Follow-up   • Virtual Regular Visit      Encounter provider Tj Mclain MD    Provider located at [address] [place] [address] [address] [facility] [id number] [phone]    Recent Visits  No visits were found meeting these conditions  Showing recent visits within past 7 days and meeting all other requirements  Today's Visits  Date Type Provider Dept   01/31/23 Telemedicine Tj Mclain MD [facility] 18 today's visits and meeting all other requirements  Future Appointments  No visits were found meeting these conditions  Showing future appointments within next 150 days and meeting all other requirements       The patient was identified by name and date of birth  Lawyer Cowan was informed that this is a telemedicine visit and that the visit is being conducted through the [employer]  He agrees to proceed     My office door was closed  No one else was in the room  He acknowledged consent and understanding of privacy and security of the video platform  The patient has agreed to participate and understands they can discontinue the visit at any time      Patient is aware this is a billable service  Subjective:    Katy Elmore is a 49 y o  male with a history of Bipolar Disorder, Generalized Anxiety Disorder and ADHD  He continues to experience on and off symptoms since the last visit  He still feels somewhat depressed -  rates mood as 3 on a scale of 1 (best mood) to 10 (worst mood), but states that he still does not feel himself  He continues to experience on and off anxiety symptoms  He states that still feels sad at times and cried during the session when his father's death was discussed  He also worries about his mother handling that loss  He still has some difficulty with concentration "There is no improvement yet"  He denies any suicidal ideation, intent or plan at present; denies any homicidal ideation, intent or plan at present  He has no auditory hallucinations, denies any visual hallucinations, has no delusional thoughts  He reports constipation  Denies any other side effects from current psychiatric medications  No rash noted or reported  Frutoso Spatz HPI ROS Appetite Changes and Sleep:     He reports increased sleep, increase in number of sleep hours (9 hours), increased appetite, recent weight gain (10 lbs), low energy    Current Rating Scores:     Current PHQ-9   PHQ-2/9 Depression Screening    Little interest or pleasure in doing things: 1 - several days  Feeling down, depressed, or hopeless: 1 - several days  Trouble falling or staying asleep, or sleeping too much: 2 - more than half the days  Feeling tired or having little energy: 1 - several days  Poor appetite or overeatin - more than half the days  Feeling bad about yourself - or that you are a failure or have let yourself or your family down: 1 - several days  Trouble concentrating on things, such as reading the newspaper or watching television: 2 - more than half the days  Moving or speaking so slowly that other people could have noticed   Or the opposite - being so fidgety or restless that you have been moving around a lot more than usual: 1 - several days  Thoughts that you would be better off dead, or of hurting yourself in some way: 0 - not at all  PHQ-9 Score: 11   PHQ-9 Interpretation: Moderate depression        Current PHQ-9 score is decreased from 16 at the last visit)  Review Of Systems:      Constitutional low energy and recent weight gain (10 lbs)   ENT negative   Cardiovascular negative   Respiratory negative   Gastrointestinal negative   Genitourinary negative   Musculoskeletal arm pain   Integumentary negative   Neurological negative   Endocrine negative   Other Symptoms none, all other systems are negative       Past Psychiatric History: (unchanged information from previous note copied and updated)    Past Inpatient Psychiatric Treatment:   No history of past inpatient psychiatric admissions  Past Outpatient Psychiatric Treatment:    In outpatient treatment at [address] [place] [address] for many years  Past Suicide Attempts: no  Past Violent Behavior: no  Past Psychiatric Medication Trials: Lamictal, Paxil, Klonopin, Strattera and Adderall XR    Traumatic History: (unchanged information from previous note copied and updated)    Abuse: no history of sexual abuse, no history of physical abuse  Other Traumatic Events: none     Substance Abuse History:    Social History     Substance and Sexual Activity   Alcohol Use Yes    Comment: social alcohol use     Social History     Substance and Sexual Activity   Drug Use No    Comment: History of cocaine use years ago  No current recreational drug use       Social History:    Social History     Socioeconomic History   • Marital status: /Civil Union     Spouse name: Not on file   • Number of children: 1   • Years of education: bachelor's degree   • Highest education level:  Bachelor's degree (e g , BA, AB, BS)   Occupational History   • Occupation: works in sales   Tobacco Use   • Smoking status: Never   • Smokeless tobacco: Never   Vaping Use   • Vaping Use: Never used   Substance and Sexual Activity   • Alcohol use: Yes     Comment: social alcohol use   • Drug use: No     Comment: History of cocaine use years ago  No current recreational drug use   • Sexual activity: Yes     Partners: Female   Other Topics Concern   • Not on file   Social History Narrative    Education: bachelor's degree in engineering    Learning Disabilities: ADHD history    Marital History:     Children: 1 daughter    Living Arrangement: lives in home with wife and daughter    Occupational History: works in sales for [employer]: good support system    Legal History: none     History: None     Social Determinants of Health     Financial Resource Strain: Low Risk    • Difficulty of Paying Living Expenses: Not hard at all   Food Insecurity: No Food Insecurity   • Worried About [address] [employer] in the Last Year: Never true   • [address] [employer] in the Last Year: Never true   Transportation Needs: No Transportation Needs   • Lack of Transportation (Medical): No   • Lack of Transportation (Non-Medical): No   Physical Activity: Inactive   • Days of Exercise per Week: 0 days   • Minutes of Exercise per Session: 0 min   Stress: No Stress Concern Present   • Feeling of Stress : Only a little   Social Connections: Moderately Integrated   • Frequency of Communication with Friends and Family: More than three times a week   • Frequency of Social Gatherings with Friends and Family: More than three times a week   • Attends Islam Services: Never   • Active Member of Clubs or Organizations:  Yes   • Attends Club or Organization Meetings: More than 4 times per year   • Marital Status:    Intimate Partner Violence: Not At Risk   • Fear of Current or Ex-Partner: No   • Emotionally Abused: No   • Physically Abused: No   • Sexually Abused: No   Housing Stability: Low Risk    • Unable to Pay for Housing in the Last Year: No   • Number of Places Lived in the Last Year: 1   • Unstable Housing in the Last Year: No       Family Psychiatric History:     Family History   Problem Relation Age of Onset   • Anxiety disorder Mother    • Substance Abuse Brother    • Suicidality Neg Hx        History Review: The following portions of the patient's history were reviewed and updated as appropriate: allergies, current medications, past family history, past medical history, past social history, past surgical history and problem list           HPI     Past Medical History:   Diagnosis Date   • Bacterial infection of knee joint ([facility] )    • CAD (coronary artery disease)    • Cellulitis    • GERD (gastroesophageal reflux disease)    • Heart attack ([facility] )    • Hyperlipidemia    • Hypertension    • Seasonal allergies        Past Surgical History:   Procedure Laterality Date   • APPENDECTOMY     • CORONARY ANGIOPLASTY WITH STENT PLACEMENT     • KNEE ARTHROTOMY     • SEPTOPLASTY     • SHOULDER SURGERY         Current Outpatient Medications   Medication Sig Dispense Refill   • atoMOXetine (STRATTERA) 60 mg capsule Take 1 capsule (60 mg total) by mouth daily 30 capsule 3   • Cholecalciferol (VITAMIN D3) 125 MCG (5000 UT) TABS Take 5,000 Units by mouth daily     • clonazePAM (KlonoPIN) 1 mg tablet Take 0 5-1 tablets (0 5-1 mg total) by mouth 2 (two) times a day as needed for anxiety 60 tablet 4   • Coenzyme Q10 (COQ-10) 100 MG CAPS Take 1 capsule by mouth 2 (two) times a day     • EPINEPHrine (EPIPEN) 0 3 mg/0 3 mL SOAJ USE AS DIRECTED AS NEEDED     • lamoTRIgine (LaMICtal) 200 MG tablet Take 2 tablets (400 mg total) by mouth daily at bedtime 60 tablet 4   • lisinopril (ZESTRIL) 5 mg tablet Take 5 mg by mouth     • metoprolol succinate (TOPROL-XL) 50 mg 24 hr tablet Take 1 5 tablets by mouth daily     • nitroglycerin (NITROSTAT) 0 4 mg SL tablet Place 0 4 mg under the tongue     • prasugrel (EFFIENT) tablet Take 1 tablet by mouth daily     • rosuvastatin (CRESTOR) 10 MG tablet Take 10 mg by mouth       No current facility-administered medications for this visit          Allergies   Allergen Reactions   • [place] (Diagnostic) - Food Allergy Other (See Comments)   • Atorvastatin      Tolerates crestor   • Nuts - Food Allergy Hives   • [place] Nut Extract Skin Test - Food Allergy Other (See Comments)   • Cefazolin Rash   • Ceftriaxone Rash       Video Exam    Vitals:    01/31/23 1603   Weight: 116 kg (255 lb)   Height: 6' 1" (1 854 m)       Mental Status Evaluation:    Appearance age appropriate, casually dressed   Behavior cooperative, appears anxious   Speech normal rate, normal volume, slightly tangential   Mood depressed, anxious   Affect constricted, tearful   Thought Processes slightly tangential   Associations tangential associations   Thought Content no overt delusions   Perceptual Disturbances: no auditory hallucinations, no visual hallucinations   Abnormal Thoughts  Risk Potential Suicidal ideation - None  Homicidal ideation - None  Potential for aggression - No   Orientation oriented to person, place, time/date and situation   Memory recent and remote memory grossly intact   Consciousness alert and awake   Attention Span Concentration Span decreased attention span  decreased concentration   Intellect appears to be of average intelligence   Insight intact   Judgement intact   Muscle Strength and  Gait normal muscle strength and normal muscle tone, normal gait and normal balance   Motor activity no abnormal movements   Language no difficulty naming common objects, no difficulty repeating a phrase, no difficulty writing a sentence   Fund of Knowledge adequate knowledge of current events  adequate fund of knowledge regarding past history  adequate fund of knowledge regarding vocabulary    Pain moderate   Pain Scale 7       Laboratory Results: I have personally reviewed all pertinent laboratory/tests results    CBC AND DIFFERENTIAL  Order: [id number]   Ref Range & Units 4/22/22  4:47 AM Hemoglobin 12 5 - 17 0 g/dL 15 3    Hematocrit 37 0 - 48 0 % 44 8    WBC 4 0 - 10 5 thou/cmm 8 1    RBC 4 00 - 5 40 mill/cmm 5 20    Platelet Count 590 - 350 thou/cmm 190    MPV 7 5 - 11 3 fL 7 3 Low     MCV 80 - 100 fL 86    MCH 27 0 - 36 0 pg 29 5    MCHC 32 0 - 37 0 g/dL 34 2    RDW 12 0 - 16 0 % 13 9    Differential Type  AUTO    Absolute Neutrophils 1 8 - 7 8 thou/cmm 6 9    Absolute Lymphocytes 1 0 - 3 0 thou/cmm 0 6 Low     Absolute Monocytes 0 3 - 1 0 thou/cmm 0 5    Absolute Eosinophils 0 0 - 0 5 thou/cmm 0 1    Absolute Basophils 0 0 - 0 1 thou/cmm 0 0    Neutrophils % 85    Lymphocytes % 7    Monocytes % 7    Eosinophils % 1    Basophils % 0      Contains abnormal data COMPREHENSIVE METABOLIC PANEL  Order: [id number]   Ref Range & Units 4/21/22  3:05 PM   Glucose 65 - 99 mg/dL 100 High     BUN 7 - 28 mg/dL 19    Creatinine 0 53 - 1 30 mg/dL 1 22    Sodium 135 - 145 mmol/L 137    Potassium 3 5 - 5 2 mmol/L 4 3    Chloride 100 - 109 mmol/L 104    Carbon Dioxide 21 - 31 mmol/L 25    Calcium 8 5 - 10 1 mg/dL 9 4    Alkaline Phosphatase 35 - 120 U/L 60    Albumin 3 5 - 5 7 g/dL 4 4    Bilirubin, Total 0 2 - 1 0 mg/dL 0 6    Comment: Eltrombopag and its metabolites may interfere with this assay causing erroneously high patient results     Protein, Total 6 3 - 8 3 g/dL 7 7    AST <41 U/L 39    ALT <56 U/L 51    Anion Gap 3 - 11 8    eGFRcr >59 74    eGFRcr Comment  Interpretive information: calculated GFR        Suicide/Homicide Risk Assessment:    Risk of Harm to Self:  Demographic risk factors include: , male  Historical Risk Factors include: history of anxiety, history of mood disorder  Recent Specific Risk Factors include: diagnosis of mood disorder, current depressive symptoms, current anxiety symptoms, recent losses (father)  Protective Factors: no current suicidal ideation, being a parent, being , compliant with medications, compliant with mental health treatment, connection to own children, responsibilities and duties to others, stable living environment, stable job, supportive family  Weapons: none  The following steps have been taken to ensure weapons are properly secured: not applicable  Based on today's assessment, Danitza Freeman presents the following risk of harm to self: minimal    Risk of Harm to Others: The following ratings are based on assessment at the time of the interview  Based on today's assessment, Danitza Freeman presents the following risk of harm to others: none    The following interventions are recommended: no intervention changes needed     Assessment/Plan:       Diagnoses and all orders for this visit:    Bipolar I disorder, most recent episode depressed, mild (HCC)    CODEY (generalized anxiety disorder)    ADHD (attention deficit hyperactivity disorder), combined type  -     atoMOXetine (STRATTERA) 60 mg capsule; Take 1 capsule (60 mg total) by mouth daily    Bereavement          Treatment Recommendations/Precautions:    Continue Lamictal 400 mg at bedtime to help with mood stabilization  Continue Klonopin 0 5 mg to 1 mg twice a day as needed to improve anxiety symptoms  Increase Strattera to 60 mg daily to help with ADHD symptoms  He does not want any additional medication changes at this time  Medication management every 3 months  Follows with family physician for yearly physical exam, cardiac issues, hyperlipidemia and hypertension  Aware of 24 hour and weekend coverage for urgent situations accessed by calling [facility] main practice number    Medications Risks/Benefits      Risks, Benefits And Possible Side Effects Of Medications:    Risks, benefits, and possible side effects of medications explained to Danitza Lydia including risk of rash related to treatment with Lamictal and risks of misuse, abuse or dependence, sedation and respiratory depression related to treatment with benzodiazepine medications   He verbalizes understanding and agreement for treatment  Controlled Medication Discussion:     Cedric Medina has been filling controlled prescriptions on time as prescribed according to [facility]  Discussed with Cedric Medina the risks of sedation, respiratory depression, impairment of ability to drive and potential for abuse and addiction related to treatment with benzodiazepine medications  He understands risk of treatment with benzodiazepine medications, agrees to not drive if feels impaired and agrees to take medications as prescribed    Psychotherapy Provided:     Individual psychotherapy provided: Yes  Counseling was provided during the session today for 16 minutes  Medications, treatment progress and treatment plan reviewed with Cedric Medina  Medication changes discussed with Cedric Medina  Medication education provided to Jeangeovany Medina  Goals discussed during in session: alleviate anxiety, improve control of depression, improve mood stability and control ADHD symptoms  Discussed with Cedric Medina coping with father's death and job stress  Coping techniques including exercising and spending time with family reviewed with Cedric Medina  Supportive therapy provided  Treatment Plan:    Completed and signed during the session: Not applicable - Treatment Plan not due at this session     Note Share: This note was shared with patient      I spent 26 minutes with patient today in which greater than 50% of the time was spent in counseling/coordination of care regarding coping with father's death    Visit Time    Visit Start Time: 4:00 PM  Visit Stop Time: 4:26 PM  Total Visit Duration: 26 minutes    Aly Dawkins MD

## 2023-01-31 ENCOUNTER — TELEMEDICINE (OUTPATIENT)
Dept: PSYCHIATRY | Facility: CLINIC | Age: 48
End: 2023-01-31

## 2023-01-31 VITALS — BODY MASS INDEX: 33.8 KG/M2 | WEIGHT: 255 LBS | HEIGHT: 73 IN

## 2023-01-31 DIAGNOSIS — Z63.4 BEREAVEMENT: ICD-10-CM

## 2023-01-31 DIAGNOSIS — F31.31 BIPOLAR I DISORDER, MOST RECENT EPISODE DEPRESSED, MILD (HCC): Primary | Chronic | ICD-10-CM

## 2023-01-31 DIAGNOSIS — F90.2 ADHD (ATTENTION DEFICIT HYPERACTIVITY DISORDER), COMBINED TYPE: Chronic | ICD-10-CM

## 2023-01-31 DIAGNOSIS — F41.1 GAD (GENERALIZED ANXIETY DISORDER): Chronic | ICD-10-CM

## 2023-01-31 RX ORDER — ATOMOXETINE 60 MG/1
60 CAPSULE ORAL DAILY
Qty: 30 CAPSULE | Refills: 3 | Status: SHIPPED | OUTPATIENT
Start: 2023-01-31 | End: 2023-05-31

## 2023-01-31 SDOH — SOCIAL STABILITY - SOCIAL INSECURITY: DISSAPEARANCE AND DEATH OF FAMILY MEMBER: Z63.4

## 2023-05-10 ENCOUNTER — TELEPHONE (OUTPATIENT)
Dept: PSYCHIATRY | Facility: CLINIC | Age: 48
End: 2023-05-10

## 2023-05-10 DIAGNOSIS — F31.31 BIPOLAR I DISORDER, MOST RECENT EPISODE DEPRESSED, MILD (HCC): Primary | Chronic | ICD-10-CM

## 2023-05-10 RX ORDER — LAMOTRIGINE 200 MG/1
400 TABLET ORAL
Qty: 60 TABLET | Refills: 5 | Status: SHIPPED | OUTPATIENT
Start: 2023-05-10 | End: 2023-11-06

## 2023-05-10 NOTE — TELEPHONE ENCOUNTER
Patient called regarding scheduling and medication  Patient scheduled for 330pm 10/31  He requests refill for Lamictal 200mg 50 pharmacy  If provider would like to see patient earlier writer will contact patient   Placed on cancellation list

## 2023-05-24 DIAGNOSIS — F41.1 GAD (GENERALIZED ANXIETY DISORDER): Primary | Chronic | ICD-10-CM

## 2023-05-25 RX ORDER — CLONAZEPAM 1 MG/1
.5-1 TABLET ORAL 2 TIMES DAILY PRN
Qty: 60 TABLET | Refills: 4 | Status: SHIPPED | OUTPATIENT
Start: 2023-05-25 | End: 2023-10-22

## 2023-06-16 DIAGNOSIS — F90.2 ADHD (ATTENTION DEFICIT HYPERACTIVITY DISORDER), COMBINED TYPE: Primary | Chronic | ICD-10-CM

## 2023-06-19 RX ORDER — ATOMOXETINE 60 MG/1
60 CAPSULE ORAL DAILY
Qty: 30 CAPSULE | Refills: 4 | Status: SHIPPED | OUTPATIENT
Start: 2023-06-19 | End: 2023-11-16

## 2023-10-17 DIAGNOSIS — F31.31 BIPOLAR I DISORDER, MOST RECENT EPISODE DEPRESSED, MILD (HCC): Primary | Chronic | ICD-10-CM

## 2023-10-17 RX ORDER — LAMOTRIGINE 200 MG/1
400 TABLET ORAL
Qty: 60 TABLET | Refills: 0 | Status: SHIPPED | OUTPATIENT
Start: 2023-10-17 | End: 2023-11-16

## 2023-10-23 NOTE — PSYCH
Virtual Regular Visit    Patient is located at Home in the following state in which I hold an active license [place]    Assessment/Plan:    Problem List Items Addressed This Visit          Other    Bipolar I disorder, most recent episode depressed, mild (HCC) - Primary (Chronic)    Relevant Medications    lamoTRIgine (LaMICtal) 200 MG tablet    CODEY (generalized anxiety disorder) (Chronic)    Relevant Medications    clonazePAM (KlonoPIN) 1 mg tablet    ADHD (attention deficit hyperactivity disorder), combined type (Chronic)       Reason for visit is   Chief Complaint   Patient presents with    Medication Management    Follow-up    Virtual Regular Visit        Encounter provider Maliha Deleon MD    Provider located at [address] [place] [id number] [phone]    Recent Visits  No visits were found meeting these conditions. Showing recent visits within past 7 days and meeting all other requirements  Today's Visits  Date Type Provider Dept   10/31/23 Telephone Maliha Deleon [address] [place] [address]   10/31/23 Telemedicine Maliha Deleon MD [facility] today's visits and meeting all other requirements  Future Appointments  No visits were found meeting these conditions. Showing future appointments within next 150 days and meeting all other requirements       The patient was identified by name and date of birth. Swathi Jennings was informed that this is a telemedicine visit and that the visit is being conducted through the [place]. He agrees to proceed. .  My office door was closed. No one else was in the room. He acknowledged consent and understanding of privacy and security of the video platform. The patient has agreed to participate and understands they can discontinue the visit at any time. Patient is aware this is a billable service.      Insurance: Payor: [employer] / Plan: Patricia Mcnamara / Product Type: PPO Commercial /     SUBJECTIVE:    Vale Garcia is seen today for a follow up for Bipolar Disorder, Generalized Anxiety Disorder, and ADHD. He has improved since the last visit. He states that mood has been more stable, reports only mild depressive symptoms "my depression is pretty well controlled". He continues to experience on and off anxiety symptoms. He also continues to experience difficulty with attention and concentration, but has not been taking Strattera for several months. He states that he does not want to be on any medications for ADHD at this time "I am trying to minimize the number of medications that I am taking". He has been trying to manage his ADHD symptoms by staying busy at work and at home, working in his yard and exercising. He feels that currently he has been coping well with his father's death last year and upcoming anniversary of his death in November. He states that he is doing well at work and is glad that he got a promotion. He denies any suicidal ideation, intent or plan at present; denies any homicidal ideation, intent or plan at present. He has no auditory hallucinations, denies any visual hallucinations, has no delusional thoughts. He reports constipation on Strattera and has not been taking Strattera for several months. Denies any other side effects from current psychiatric medications. No rash noted or reported.     HPI ROS Appetite Changes and Sleep:     He reports normal sleep, adequate number of sleep hours (7 hours), normal appetite, recent weight loss (7 lbs), fluctuating energy levels    Current Rating Scores:     Current PHQ-9   PHQ-2/9 Depression Screening    Little interest or pleasure in doing things: 1 - several days  Feeling down, depressed, or hopeless: 1 - several days  Trouble falling or staying asleep, or sleeping too much: 2 - more than half the days  Feeling tired or having little energy: 1 - several days  Poor appetite or overeatin - several days  Feeling bad about yourself - or that you are a failure or have let yourself or your family down: 0 - not at all  Trouble concentrating on things, such as reading the newspaper or watching television: 1 - several days  Moving or speaking so slowly that other people could have noticed. Or the opposite - being so fidgety or restless that you have been moving around a lot more than usual: 1 - several days  Thoughts that you would be better off dead, or of hurting yourself in some way: 0 - not at all  PHQ-9 Score: 8   PHQ-9 Interpretation: Mild depression          Current PHQ-9 score is decreased from 11 at the last visit). Review Of Systems:      Constitutional recent weight loss (7 lbs) and fluctuating energy level   ENT nasal congestion   Cardiovascular negative   Respiratory negative   Gastrointestinal negative   Genitourinary negative   Musculoskeletal negative   Integumentary negative   Neurological negative   Endocrine negative   Other Symptoms none, all other systems are negative       Past Psychiatric History: (unchanged information from previous note copied and updated)    Past Inpatient Psychiatric Treatment:   No history of past inpatient psychiatric admissions. Past Outpatient Psychiatric Treatment:    In outpatient treatment at [address] [place] [address] for many years.   Past Suicide Attempts: no  Past Violent Behavior: no  Past Psychiatric Medication Trials: Lamictal, Paxil, Klonopin, Strattera and Adderall XR    Traumatic History: (unchanged information from previous note copied and updated)    Abuse: no history of sexual abuse, no history of physical abuse  Other Traumatic Events: none     Past Medical History:    Past Medical History:   Diagnosis Date    Bacterial infection of knee joint (HCC)     CAD (coronary artery disease)     Cellulitis     GERD (gastroesophageal reflux disease)     Heart attack ([address] )     Hyperlipidemia     Hypertension Seasonal allergies         Past Surgical History:   Procedure Laterality Date    APPENDECTOMY      CORONARY ANGIOPLASTY WITH STENT PLACEMENT      KNEE ARTHROTOMY      SEPTOPLASTY      SHOULDER SURGERY       Allergies   Allergen Reactions    [place] (Diagnostic) - Food Allergy Other (See Comments)    Atorvastatin      Tolerates crestor    Nuts - Food Allergy Hives    [place] Nut Extract Skin Test - Food Allergy Other (See Comments)    Cefazolin Rash    Ceftriaxone Rash       Substance Abuse History:    Social History     Substance and Sexual Activity   Alcohol Use Yes    Comment: social alcohol use     Social History     Substance and Sexual Activity   Drug Use No    Comment: History of cocaine use years ago. No current recreational drug use       Social History:    Social History     Socioeconomic History    Marital status: /Civil Union     Spouse name: Not on file    Number of children: 1    Years of education: bachelor's degree    Highest education level: Bachelor's degree (e.g., BA, AB, BS)   Occupational History    Occupation: works in [employer]   Tobacco Use    Smoking status: Never    Smokeless tobacco: Never   Vaping Use    Vaping Use: Never used   Substance and Sexual Activity    Alcohol use: Yes     Comment: social alcohol use    Drug use: No     Comment: History of cocaine use years ago.  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    Legal History: none     History: None     Social Determinants of Health     Financial Resource Strain: Low Risk  (10/31/2023)    Overall Financial Resource Strain (CARDIA)     Difficulty of Paying Living Expenses: Not hard at all   Food Insecurity: No Food Insecurity (10/31/2023)    Hunger Vital Sign     Worried About Running Out of Food in the Last Year: Never true     Ran Out of Food in the Last Year: Never true   Transportation Needs: No Transportation Needs (10/31/2023)    PRAPARE - Transportation     Lack of Transportation (Medical): No     Lack of Transportation (Non-Medical): No   Physical Activity: Sufficiently Active (10/31/2023)    Exercise Vital Sign     Days of Exercise per Week: 3 days     Minutes of Exercise per Session: 90 min   Stress: No Stress Concern Present (10/31/2023)    [facility]     Feeling of Stress : Only a little   Social Connections: Moderately Integrated (10/31/2023)    Social Connection and Isolation Panel [NHANES]     Frequency of Communication with Friends and Family: More than three times a week     Frequency of Social Gatherings with Friends and Family: More than three times a week     Attends Voodoo Services: Never     Active Member of Clubs or Organizations: Yes     Attends Club or Organization Meetings: More than 4 times per year     Marital Status:    Intimate Partner Violence: Not At Risk (10/31/2023)    Humiliation, Afraid, Rape, and Kick questionnaire     Fear of Current or Ex-Partner: No     Emotionally Abused: No     Physically Abused: No     Sexually Abused: No   Housing Stability: Low Risk  (10/31/2023)    Housing Stability Vital Sign     Unable to Pay for Housing in the Last Year: No     Number of Places Lived in the Last Year: 1     Unstable Housing in the Last Year: No       Family Psychiatric History:     Family History   Problem Relation Age of Onset    Anxiety disorder Mother     Substance Abuse Brother     Suicidality Neg Hx        History Review:  The following portions of the patient's history were reviewed and updated as appropriate: allergies, current medications, past family history, past medical history, past social history, past surgical history, and problem list.         OBJECTIVE:     Vital signs in last 24 hours:    Vitals:    10/31/23 1533   Weight: 112 kg (248 lb)   Height: 6' 1" (1.854 m)       Mental Status Evaluation:    Appearance age appropriate, casually dressed   Behavior cooperative, mildly anxious   Speech normal rate, normal volume, normal pitch   Mood mildly anxious   Affect normal range and intensity, appropriate   Thought Processes organized, goal directed   Associations intact associations   Thought Content no overt delusions   Perceptual Disturbances: no auditory hallucinations, no visual hallucinations   Abnormal Thoughts  Risk Potential Suicidal ideation - None  Homicidal ideation - None  Potential for aggression - No   Orientation oriented to person, place, time/date, and situation   Memory recent and remote memory grossly intact   Consciousness alert and awake   Attention Span Concentration Span decreased attention span  decreased concentration   Intellect appears to be of average intelligence   Insight intact   Judgement intact   Muscle Strength and  Gait normal muscle strength and normal muscle tone, normal gait and normal balance   Motor activity no abnormal movements   Language no difficulty naming common objects, no difficulty repeating a phrase, no difficulty writing a sentence   Fund of Knowledge adequate knowledge of current events  adequate fund of knowledge regarding past history  adequate fund of knowledge regarding vocabulary    Pain none   Pain Scale 0       Laboratory Results: I have personally reviewed all pertinent laboratory/tests results    CBC AND DIFFERENTIAL  Order: [id number]   Ref Range & Units 8/23/23 11:25 AM   Hemoglobin 12.5 - 17.0 g/dL 14.4   Hematocrit 37.0 - 48.0 % 42.4   WBC 4.0 - 10.5 thou/cmm 8.0   RBC 4.00 - 5.40 mill/cmm 4.72   Platelet Count 493 - 350 thou/cmm 233   MPV 7.5 - 11.3 fL 8. 1   MCV 80 - 100 fL 90   MCH 27.0 - 36.0 pg 30.4   MCHC 32.0 - 37.0 g/dL 33.9   RDW 12.0 - 16.0 % 13.9   Differential Type  AUTO   Absolute Neutrophils 1.8 - 7.8 thou/cmm 3.6   Absolute Lymphocytes 1.0 - 3.0 thou/cmm 3.3 High    Absolute Monocytes 0.3 - 1.0 thou/cmm 0.8   Absolute Eosinophils 0.0 - 0.5 thou/cmm 0.3   Absolute Basophils 0.0 - 0.1 thou/cmm 0.1   Neutrophils % 44   Lymphocytes % 41   Monocytes % 10   Eosinophils % 4   Basophils % 1     COMPREHENSIVE METABOLIC PANEL  Order: [id number]   Ref Range & Units 8/23/23 11:25 AM   Glucose 65 - 99 mg/dL 82   BUN 7 - 28 mg/dL 14   Creatinine 0.53 - 1.30 mg/dL 1.03   Sodium 135 - 145 mmol/L 137   Potassium 3.5 - 5.2 mmol/L 4.5   Chloride 100 - 109 mmol/L 102   Carbon Dioxide 21 - 31 mmol/L 28   Calcium 8.5 - 10.1 mg/dL 9.5   Alkaline Phosphatase 35 - 120 U/L 53   Albumin 3.5 - 5.7 g/dL 4.3   Bilirubin, Total 0.2 - 1.0 mg/dL 0.6   Comment: Eltrombopag and its metabolites may interfere with this assay causing erroneously high patient results. Protein, Total 6.3 - 8.3 g/dL 7.3   AST <41 U/L 24   ALT <56 U/L 24   Anion Gap 3 - 11 7   eGFRcr >59 89   eGFRcr Comment  Interpretive information: calculated GFR     LIPID PANEL  Order: [id number]   Ref Range & Units 8/23/23 11:25 AM   Cholesterol <200 mg/dL 158   Triglyceride <150 mg/dL 168 High    Cholesterol, HDL, Direct 23 - 92 mg/dL 54   Cholesterol, Non-HDL <160 mg/dL 104   Cholesterol, LDL, Calculated <130 mg/dL 70   Comment: LDL Cholesterol was calculated using the Friedewald equation. Direct measurement of LDL is not indicated for this patient based on [facility]'s analytical algorithm for measurement of LDL Cholesterol.    CHOL/HDL Ratio  2.9     Suicide/Homicide Risk Assessment:    Risk of Harm to Self:  Demographic risk factors include: , male  Historical Risk Factors include: history of anxiety, history of mood disorder  Recent Specific Risk Factors include: diagnosis of mood disorder  Protective Factors: no current suicidal ideation, being a parent, being , compliant with medications, compliant with mental health treatment, connection to own children, responsibilities and duties to others, stable living environment, stable job, supportive family  Weapons: none. The following steps have been taken to ensure weapons are properly secured: not applicable  Based on today's assessment, Mark Noble presents the following risk of harm to self: minimal    Risk of Harm to Others: The following ratings are based on assessment at the time of the interview  Based on today's assessment, Mark Noble presents the following risk of harm to others: none    The following interventions are recommended: no intervention changes needed    Assessment/Plan:       Diagnoses and all orders for this visit:    Bipolar I disorder, most recent episode depressed, mild (HCC)  -     lamoTRIgine (LaMICtal) 200 MG tablet; Take 2 tablets (400 mg total) by mouth daily at bedtime    CODEY (generalized anxiety disorder)  -     clonazePAM (KlonoPIN) 1 mg tablet;  Take 0.5-1 tablets (0.5-1 mg total) by mouth 2 (two) times a day as needed for anxiety    ADHD (attention deficit hyperactivity disorder), combined type          Treatment Recommendations/Precautions:    Continue Lamictal 400 mg at bedtime to help with mood stabilization  Continue Klonopin 0.5 mg to 1 mg twice a day as needed to improve anxiety symptoms  Off Strattera - does not want to restart and does not want to start another agent for ADHD control  Medication management every 4 months  Follows with family physician for yearly physical exam, cardiac issues, hyperlipidemia, and hypertension  Aware of 24 hour and weekend coverage for urgent situations accessed by calling [facility] main practice number  I am scheduling this patient out for greater than 3 months: Yes - Patient's stability of symptoms warrant this length of time or no significant changes to treatment plan    Medications Risks/Benefits      Risks, Benefits And Possible Side Effects Of Medications:    Risks, benefits, and possible side effects of medications explained to Chhaya Bernard including risk of rash related to treatment with Lamictal and risks of misuse, abuse or dependence, sedation and respiratory depression related to treatment with benzodiazepine medications. He verbalizes understanding and agreement for treatment. Controlled Medication Discussion:     Chhaya Bernard has been filling controlled prescriptions on time as prescribed according to [address] [facility]  Discussed with ARIANAmichelleshayan Joana the risks of sedation, respiratory depression, impairment of ability to drive and potential for abuse and addiction related to treatment with benzodiazepine medications. He understands risk of treatment with benzodiazepine medications, agrees to not drive if feels impaired and agrees to take medications as prescribed    Psychotherapy Provided:     Individual psychotherapy provided: Yes  Counseling was provided during the session today for 16 minutes. Medications, treatment progress and treatment plan reviewed with Chhaya Bernard. Medication education provided to Chhaya Bernard. Goals discussed during in session: maintain control of anxiety, maintain mood stability, and control ADHD symptoms. Discussed with Chhaya Bernard coping with anniversary of father's death and chronic mental illness. Coping strategies including exercising, keeping busy at home, keeping busy at work, maintain healthy diet, and maintain positive attitude reviewed with Chhaya Bernard. Supportive therapy provided. Treatment Plan:    Completed and signed during the session: Yes - Treatment Plan done but not signed at time of office visit due to:  Plan reviewed by video and verbal consent given due to virtual visit. Treatment Plan sent to patient via [employer] for signature. Note Share: This note was shared with patient.     Visit Time    Visit Start Time: 3:34 PM  Visit Stop Time: 4:00 PM  Total Visit Duration:  26 minutes    I spent 26 minutes with patient today in which greater than 50% of the time was spent in counseling/coordination of care regarding coping with anniversary of father's death    Julianne Woodard MD 10/31/23

## 2023-10-31 ENCOUNTER — TELEMEDICINE (OUTPATIENT)
Dept: PSYCHIATRY | Facility: CLINIC | Age: 48
End: 2023-10-31
Payer: COMMERCIAL

## 2023-10-31 ENCOUNTER — TELEPHONE (OUTPATIENT)
Dept: PSYCHIATRY | Facility: CLINIC | Age: 48
End: 2023-10-31

## 2023-10-31 VITALS — BODY MASS INDEX: 32.87 KG/M2 | WEIGHT: 248 LBS | HEIGHT: 73 IN

## 2023-10-31 DIAGNOSIS — F90.2 ADHD (ATTENTION DEFICIT HYPERACTIVITY DISORDER), COMBINED TYPE: Chronic | ICD-10-CM

## 2023-10-31 DIAGNOSIS — F41.1 GAD (GENERALIZED ANXIETY DISORDER): Chronic | ICD-10-CM

## 2023-10-31 DIAGNOSIS — F31.31 BIPOLAR I DISORDER, MOST RECENT EPISODE DEPRESSED, MILD (HCC): Primary | Chronic | ICD-10-CM

## 2023-10-31 PROCEDURE — 99214 OFFICE O/P EST MOD 30 MIN: CPT | Performed by: PSYCHIATRY & NEUROLOGY

## 2023-10-31 PROCEDURE — 90833 PSYTX W PT W E/M 30 MIN: CPT | Performed by: PSYCHIATRY & NEUROLOGY

## 2023-10-31 RX ORDER — CLONAZEPAM 1 MG/1
.5-1 TABLET ORAL 2 TIMES DAILY PRN
Qty: 60 TABLET | Refills: 4 | Status: SHIPPED | OUTPATIENT
Start: 2023-10-31 | End: 2024-03-29

## 2023-10-31 RX ORDER — LAMOTRIGINE 200 MG/1
400 TABLET ORAL
Qty: 60 TABLET | Refills: 4 | Status: SHIPPED | OUTPATIENT
Start: 2023-10-31 | End: 2024-03-29

## 2023-10-31 NOTE — TELEPHONE ENCOUNTER
Pt called to get his appt for 10/31 at 3:30 switched to a virtual visit due to not feeling well. Writer switched appt.

## 2023-10-31 NOTE — BH TREATMENT PLAN
TREATMENT PLAN (Medication Management Only)        [address]    Name/Date of Birth/MRN:  Sienna Bains 51 y.o. 1975 MRN: [id number]  Date of Treatment Plan: October 31, 2023  Diagnosis/Diagnoses:   1. Bipolar I disorder, most recent episode depressed, mild ([address])    2. CODEY (generalized anxiety disorder)    3. ADHD (attention deficit hyperactivity disorder), combined type      Strengths/Personal Resources for Self-Care: "my persistence". Area/Areas of need (in own words): "getting back to consistently positive routine". 1. Long Term Goal:   maintain control of anxiety, maintain mood stability, control ADHD symptoms  Target Date: 4 months - 2/29/2024  Person/Persons responsible for completion of goal: Zhen Gilliland  2. Short Term Objective (s) - How will we reach this goal?:   A. Provider new recommended medication/dosage changes and/or continue medication(s): discontinue Strattera, continue all other medications (Lamictal and Klonopin). B.  N/A.  C.  N/A. Target Date: 4 months - 2/29/2024  Person/Persons Responsible for Completion of Goal: Zhen Gilliland   Progress Towards Goals: stable  Treatment Modality: medication management every 4 months  Review due 180 days from date of this plan: 6 months - 4/30/2024  Expected length of service: maintenance unless revised  My Physician/PA/NP and I have developed this plan together and I agree to work on the goals and objectives. I understand the treatment goals that were developed for my treatment.   Electronic Signatures: on file (unless signed below)    Juli Gallegos MD 10/31/23 [employer]

## 2023-11-01 ENCOUNTER — TELEPHONE (OUTPATIENT)
Dept: PSYCHIATRY | Facility: CLINIC | Age: 48
End: 2023-11-01

## 2023-11-01 NOTE — TELEPHONE ENCOUNTER
Called and left message for patient to return call to [phone] and schedule 4 month follow up with provider (2/29/2024 ). Please schedule.

## 2024-03-18 ENCOUNTER — TELEPHONE (OUTPATIENT)
Dept: PSYCHIATRY | Facility: CLINIC | Age: 49
End: 2024-03-18

## 2024-03-18 NOTE — TELEPHONE ENCOUNTER
Received a fax from [facility] requesting a refill of the Lamotrigine 200 mg        Pt will need a follow up scheduled before refill review. Thanks!

## 2024-03-18 NOTE — TELEPHONE ENCOUNTER
Called and left message for patient to return call and schedule follow up visit with provider. Informed of pending refill request.

## 2024-04-22 ENCOUNTER — TELEPHONE (OUTPATIENT)
Dept: PSYCHIATRY | Facility: CLINIC | Age: 49
End: 2024-04-22

## 2024-04-22 DIAGNOSIS — F31.31 BIPOLAR I DISORDER, MOST RECENT EPISODE DEPRESSED, MILD (HCC): Primary | Chronic | ICD-10-CM

## 2024-04-22 RX ORDER — LAMOTRIGINE 200 MG/1
400 TABLET ORAL
Qty: 60 TABLET | Refills: 4 | Status: SHIPPED | OUTPATIENT
Start: 2024-04-22 | End: 2024-09-19

## 2024-04-22 NOTE — TELEPHONE ENCOUNTER
Medication Refill Request     Name of Medication lamictal  Dose/Frequency 200mg take 2 tablets by mouth daily at bedtime  Quantity 60  Verified pharmacy   [x]  Verified ordering Provider   [x]  Does patient have enough for the next 3 days? Yes [] No [x]  Does patient have a follow-up appointment scheduled? Yes [x] No []   If so when is appointment: 9/20/2024 3pm

## 2024-04-22 NOTE — TELEPHONE ENCOUNTER
Patient contacted the office to schedule a follow up visit with provider. Patient is now scheduled for 9/20/2024  at 3pm virtually.

## 2024-06-26 DIAGNOSIS — F41.1 GAD (GENERALIZED ANXIETY DISORDER): Primary | Chronic | ICD-10-CM

## 2024-06-26 RX ORDER — CLONAZEPAM 1 MG/1
.5-1 TABLET ORAL 2 TIMES DAILY PRN
Qty: 60 TABLET | Refills: 2 | Status: SHIPPED | OUTPATIENT
Start: 2024-06-26 | End: 2024-09-24

## 2024-06-26 NOTE — TELEPHONE ENCOUNTER
Medication Refill Request     Name of Medication Clonazepam  Dose/Frequency 1 mg/ Take 0.5-1 tablet by mouth 2 times a day as needed for anxiety.  Quantity 60  Verified pharmacy   [x]  Verified ordering Provider   [x]  Does patient have enough for the next 3 days? Yes [] No [x]  Does patient have a follow-up appointment scheduled? Yes [x] No []   If so when is appointment: 9/20/2024

## 2024-08-07 DIAGNOSIS — F31.31 BIPOLAR I DISORDER, MOST RECENT EPISODE DEPRESSED, MILD (HCC): Chronic | ICD-10-CM

## 2024-08-07 RX ORDER — LAMOTRIGINE 200 MG/1
400 TABLET ORAL
Qty: 60 TABLET | Refills: 0 | Status: SHIPPED | OUTPATIENT
Start: 2024-08-07

## 2024-09-07 DIAGNOSIS — F31.31 BIPOLAR I DISORDER, MOST RECENT EPISODE DEPRESSED, MILD (HCC): Primary | Chronic | ICD-10-CM

## 2024-09-10 RX ORDER — LAMOTRIGINE 200 MG/1
400 TABLET ORAL
Qty: 60 TABLET | Refills: 0 | Status: SHIPPED | OUTPATIENT
Start: 2024-09-10 | End: 2024-09-20 | Stop reason: SDUPTHER

## 2024-09-13 NOTE — PSYCH
MEDICATION MANAGEMENT NOTE        [facility]      Name and Date of Birth:  Zhen Sanabria 49 y.o. 1975 MRN: [id number]    Insurance: Payor: [employer] / Plan: [employer] / Product Type: PPO Commercial /     Date of Visit: September 20, 2024    Reason for Visit:   Chief Complaint   Patient presents with    Medication Management    Follow-up    Virtual Regular Visit       Assessment & Plan  Bipolar I disorder, most recent episode depressed, in remission (HCC)  Mood is stable  Continue Lamictal 400 mg at bedtime to help with mood stabilization  Orders:    lamoTRIgine (LaMICtal) 200 MG tablet; Take 2 tablets (400 mg total) by mouth daily at bedtime    CODEY (generalized anxiety disorder)  Anxiety is controlled  Continue Klonopin 0.5 mg to 1 mg twice a day as needed to control anxiety symptoms  Orders:    clonazePAM (KlonoPIN) 1 mg tablet; Take 0.5-1 tablets (0.5-1 mg total) by mouth 2 (two) times a day as needed for anxiety Do not start before October 12, 2024.    ADHD (attention deficit hyperactivity disorder), combined type          Treatment Recommendations/Precautions:    Follows with family physician for yearly physical exam, cardiac issues, hyperlipidemia, and hypertension  Aware of 24 hour and weekend coverage for urgent situations accessed by calling [facility] main practice number  Medication management every 4 months  Crisis Plan was completed during the session and Crisis Plan Note was provided to the patient via [employer]  I am scheduling this patient out for greater than 3 months: Yes - Patient's stability of symptoms warrant this length of time or no significant changes to treatment plan    Medications Risks/Benefits:      Risks, Benefits And Possible Side Effects Of Medications:    Risks, benefits, and possible side effects of medications explained to Zhen including risk of rash related to treatment with Lamictal  "and risks of misuse, abuse or dependence, sedation and respiratory depression related to treatment with benzodiazepine medications. He verbalizes understanding and agreement for treatment.    Controlled Medication Discussion:     Zhen has been filling controlled prescriptions on time as prescribed according to [place] Prescription Drug Monitoring Program  Discussed with Zhne the risks of sedation, respiratory depression, impairment of ability to drive and potential for abuse and addiction related to treatment with benzodiazepine medications. He understands risk of treatment with benzodiazepine medications, agrees to not drive if feels impaired and agrees to take medications as prescribed    SUBJECTIVE:    Zhen is seen today for a follow up for Bipolar Disorder, Generalized Anxiety Disorder, and ADHD. He has done fairly well since the last visit. He states that mood continues to be stable, denies any significant depressive symptoms or manic symptoms. He reports that anxiety symptoms are controlled. \"I have been taking Klonopin only occasionally\". He has been exercising regularly, is able to manage stress at work and reports stable relationship with his wife. He feels that his ADHD symptoms are manage fairly well off the medications and reports only mild occasional issues with attention and concentration    He denies any suicidal ideation, intent or plan at present; denies any homicidal ideation, intent or plan at present.    He has no auditory hallucinations, denies any visual hallucinations, has no delusional thoughts.    He denies any side effects from current psychiatric medications. No rash noted or reported.    HPI ROS Appetite Changes and Sleep:     He reports normal sleep, adequate number of sleep hours (8 hours), normal appetite, recent weight gain (4 lbs), normal energy level    Current Rating Scores:     Current PHQ-9   PHQ-2/9 Depression Screening    Little interest or pleasure in doing things: 0 - " not at all  Feeling down, depressed, or hopeless: 0 - not at all  Trouble falling or staying asleep, or sleeping too much: 1 - several days  Feeling tired or having little energy: 0 - not at all  Poor appetite or overeatin - not at all  Feeling bad about yourself - or that you are a failure or have let yourself or your family down: 0 - not at all  Trouble concentrating on things, such as reading the newspaper or watching television: 1 - several days  Moving or speaking so slowly that other people could have noticed. Or the opposite - being so fidgety or restless that you have been moving around a lot more than usual: 1 - several days  Thoughts that you would be better off dead, or of hurting yourself in some way: 0 - not at all  PHQ-9 Score: 3  PHQ-9 Interpretation: No or Minimal depression       Current PHQ-9 score is decreased from 8 at the last visit).    Review Of Systems:      Constitutional recent weight gain (4 lbs)   ENT negative   Cardiovascular negative   Respiratory negative   Gastrointestinal negative   Genitourinary negative   Musculoskeletal negative   Integumentary negative   Neurological negative   Endocrine negative   Other Symptoms none, all other systems are negative       Past Psychiatric History: (unchanged information from previous note copied and updated)    Past Inpatient Psychiatric Treatment:   No history of past inpatient psychiatric admissions.  Past Outpatient Psychiatric Treatment:    In outpatient treatment at [facility] for many years.  Past Suicide Attempts: no  Past Violent Behavior: no  Past Psychiatric Medication Trials: Lamictal, Paxil, Klonopin, Strattera and Adderall XR    Traumatic History: (unchanged information from previous note copied and updated)    Abuse: no history of sexual abuse, no history of physical abuse  Other Traumatic Events: none    Past Medical History:    Past Medical History:   Diagnosis Date    Bacterial infection of knee joint  (HCC)     CAD (coronary artery disease)     Cellulitis     GERD (gastroesophageal reflux disease)     Heart attack (HCC)     Hyperlipidemia     Hypertension     Seasonal allergies         Past Surgical History:   Procedure Laterality Date    APPENDECTOMY      CORONARY ANGIOPLASTY WITH STENT PLACEMENT      KNEE ARTHROTOMY      SEPTOPLASTY      SHOULDER SURGERY       Allergies   Allergen Reactions    [place] (Diagnostic) - Food Allergy Other (See Comments)    Atorvastatin      Tolerates crestor    Nuts - Food Allergy Hives    [place] Nut Extract Skin Test - Food Allergy Other (See Comments)    Cefazolin Rash    Ceftriaxone Rash       Current Outpatient Medications:    Current Outpatient Medications   Medication Sig Dispense Refill    [START ON 10/12/2024] clonazePAM (KlonoPIN) 1 mg tablet Take 0.5-1 tablets (0.5-1 mg total) by mouth 2 (two) times a day as needed for anxiety Do not start before October 12, 2024. 60 tablet 4    Coenzyme Q10 (COQ-10) 100 MG CAPS Take 1 capsule by mouth 2 (two) times a day      EPINEPHrine (EPIPEN) 0.3 mg/0.3 mL SOAJ USE AS DIRECTED AS NEEDED      lamoTRIgine (LaMICtal) 200 MG tablet Take 2 tablets (400 mg total) by mouth daily at bedtime 60 tablet 5    lisinopril (ZESTRIL) 5 mg tablet Take 5 mg by mouth      metoprolol succinate (TOPROL-XL) 50 mg 24 hr tablet Take 1.5 tablets by mouth daily      prasugrel (EFFIENT) tablet Take 1 tablet by mouth daily      rosuvastatin (CRESTOR) 10 MG tablet Take 10 mg by mouth      Zepbound 5 MG/0.5ML auto-injector Inject 5 mg under the skin once a week      nitroglycerin (NITROSTAT) 0.4 mg SL tablet Place 0.4 mg under the tongue (Patient not taking: Reported on 9/20/2024)       No current facility-administered medications for this visit.       Substance Abuse History:    Social History     Substance and Sexual Activity   Alcohol Use Yes    Comment: social alcohol use     Social History     Substance and Sexual Activity   Drug Use No    Comment: History  of cocaine use years ago. No current recreational drug use       Social History:    Social History     Socioeconomic History    Marital status: /Civil Union     Spouse name: Not on file    Number of children: 1    Years of education: bachelor's degree    Highest education level: Bachelor's degree (e.g., BA, AB, BS)   Occupational History    Occupation: works in sales   Tobacco Use    Smoking status: Never    Smokeless tobacco: Never   Vaping Use    Vaping status: Never Used   Substance and Sexual Activity    Alcohol use: Yes     Comment: social alcohol use    Drug use: No     Comment: History of cocaine use years ago. No current recreational drug use    Sexual activity: Yes     Partners: Female   Other Topics Concern    Not on file   Social History Narrative    Education: bachelor's degree in engineering    Learning Disabilities: ADHD history    Marital History:     Children: 1 daughter    Living Arrangement: lives in home with wife and daughter    Occupational History: works in [employer] for [employer]    Functioning Relationships: good support system    Legal History: none     History: None     Social Determinants of Health     Financial Resource Strain: Low Risk  (9/20/2024)    Overall Financial Resource Strain (CARDIA)     Difficulty of Paying Living Expenses: Not hard at all   Food Insecurity: No Food Insecurity (9/20/2024)    Hunger Vital Sign     Worried About Running Out of Food in the Last Year: Never true     Ran Out of Food in the Last Year: Never true   Transportation Needs: No Transportation Needs (9/20/2024)    PRAPARE - Transportation     Lack of Transportation (Medical): No     Lack of Transportation (Non-Medical): No   Physical Activity: Sufficiently Active (9/20/2024)    Exercise Vital Sign     Days of Exercise per Week: 6 days     Minutes of Exercise per Session: 120 min   Stress: No Stress Concern Present (9/20/2024)    [employer] [place] [employer] -  "Occupational Stress Questionnaire     Feeling of Stress : Only a little   Social Connections: Moderately Integrated (9/20/2024)    Social Connection and Isolation Panel [NHANES]     Frequency of Communication with Friends and Family: More than three times a week     Frequency of Social Gatherings with Friends and Family: More than three times a week     Attends Yazidism Services: Never     Active Member of Clubs or Organizations: Yes     Attends Club or Organization Meetings: More than 4 times per year     Marital Status:    Intimate Partner Violence: Not At Risk (9/20/2024)    Humiliation, Afraid, Rape, and Kick questionnaire     Fear of Current or Ex-Partner: No     Emotionally Abused: No     Physically Abused: No     Sexually Abused: No   Housing Stability: Low Risk  (9/20/2024)    Housing Stability Vital Sign     Unable to Pay for Housing in the Last Year: No     Number of Times Moved in the Last Year: 0     Homeless in the Last Year: No       Family Psychiatric History:     Family History   Problem Relation Age of Onset    Anxiety disorder Mother     Substance Abuse Brother     Suicidality Neg Hx        History Review: The following portions of the patient's history were reviewed and updated as appropriate: allergies, current medications, past family history, past medical history, past social history, past surgical history, and problem list.         OBJECTIVE:     Vital signs in last 24 hours:    Vitals:    09/20/24 1502   Weight: 114 kg (252 lb)   Height: 6' 1\" (1.854 m)       Mental Status Evaluation:    Appearance age appropriate, casually dressed   Behavior cooperative, calm   Speech normal rate, normal volume, normal pitch   Mood euthymic   Affect normal range and intensity, appropriate   Thought Processes organized, goal directed   Associations intact associations   Thought Content no overt delusions   Perceptual Disturbances: no auditory hallucinations, no visual hallucinations   Abnormal " Thoughts  Risk Potential Suicidal ideation - None  Homicidal ideation - None  Potential for aggression - No   Orientation oriented to person, place, time/date, and situation   Memory recent and remote memory grossly intact   Consciousness alert and awake   Attention Span Concentration Span attention span and concentration are age appropriate   Intellect appears to be of average intelligence   Insight intact   Judgement intact   Muscle Strength and  Gait normal muscle strength and normal muscle tone, normal gait and normal balance   Motor activity no abnormal movements   Language no difficulty naming common objects, no difficulty repeating a phrase, no difficulty writing a sentence   Fund of Knowledge adequate knowledge of current events  adequate fund of knowledge regarding past history  adequate fund of knowledge regarding vocabulary    Pain none   Pain Scale 0       Laboratory Results: I have personally reviewed all pertinent laboratory/tests results    CBC AND DIFFERENTIAL  Order: [id number]  Component  Ref Range & Units 8/23/23 11:25 AM   Hemoglobin  12.5 - 17.0 g/dL 14.4   Hematocrit  37.0 - 48.0 % 42.4   WBC  4.0 - 10.5 thou/cmm 8.0   RBC  4.00 - 5.40 mill/cmm 4.72   Platelet  140 - 350 thou/cmm 233   MPV  7.5 - 11.3 fL 8.1   MCV  80 - 100 fL 90   MCH  27.0 - 36.0 pg 30.4   MCHC  32.0 - 37.0 g/dL 33.9   RDW  12.0 - 16.0 % 13.9   Differential Type AUTO   Absolute Neutrophils  1.8 - 7.8 thou/cmm 3.6   Absolute Lymphocytes  1.0 - 3.0 thou/cmm 3.3 High    Absolute Monocytes  0.3 - 1.0 thou/cmm 0.8   Absolute Eosinophils  0.0 - 0.5 thou/cmm 0.3   Absolute Basophils  0.0 - 0.1 thou/cmm 0.1   Neutrophils  % 44   Lymphocytes Manual  % 41   Monocytes  % 10   Eosinophils  % 4   Basophils  % 1     COMPREHENSIVE METABOLIC PANEL  Order: [id number]   Status: Edited Result - FINAL       Next appt: 09/20/2024 at 03:00 PM in Psychiatry (Gail Sanders MD)                Component  Ref Range & Units 8/23/23 11:25 AM 4/22/22   4:47 AM 4/21/22  3:05 PM 4/18/22  9:20 PM 12/9/20  3:05 PM 5/17/19 10:55 AM 5/17/15  3:25 PM   Glucose  65 - 99 mg/dL 82 107 High  100 High  81 80 80 93 R, CM   BUN  7 - 28 mg/dL 14 18 19 16 13 19 21 R   Creatinine  0.53 - 1.30 mg/dL 1.03 1.03 1.22 1.13 1.25 1.30 1.14 R, CM   Sodium  135 - 145 mmol/L 137 135 137 138 141 139 141   Potassium  3.5 - 5.2 mmol/L 4.5 3.6 4.3 4.5 CM 4.2 4.9 3.4 Low  R   Chloride  100 - 109 mmol/L 102 104 104 106 106 104 105 R   Carbon Dioxide  21 - 31 mmol/L 28 23 25 24 30 R 26 R 27   Calcium  8.5 - 10.1 mg/dL 9.5 8.3 Low  9.4 9.3 9.6 9.8 8.5 R   Alkaline Phosphatase  35 - 120 U/L 53  60 59 75 75    ALBUMIN  3.5 - 5.7 g/dL 4.3  4.4 4.3 4.2 R 4.4 R    Total Bilirubin  0.2 - 1.0 mg/dL 0.6  0.6 CM 0.4 CM 0.4 CM 0.5    Comment: Eltrombopag and its metabolites may interfere with this assay causing erroneously high patient results.   Protein, Total  6.3 - 8.3 g/dL 7.3  7.7 7.1 7.8 7.6    AST  <41 U/L 24  39 35 CM 29 25    ALT  <56 U/L 24  51 35 53 31    ANION GAP  3 - 11 7 8 8 8 5 9 9 R   eGFRcr  >59 89 90 74 81 69 R, CM 66 R, CM    eGFR Comment Interpretive information: calculated GFR Interpretive information: calculated GFR CM Interpretive information: calculated GFR CM Interpretive information: calculated GFR CM           LIPID PANEL  Order: [id number]  Component  Ref Range & Units 8/23/23 11:25 AM   Cholesterol  <200 mg/dL 158   Triglycerides  <150 mg/dL 168 High    Cholesterol, HDL, Direct  23 - 92 mg/dL 54   Cholesterol, Non-HDL  <160 mg/dL 104   LDL Cholesterol  <130 mg/dL 70   Comment: LDL Cholesterol was calculated using the Friedewald equation. Direct measurement of LDL is not indicated for this patient based on [facility]'s analytical algorithm for measurement of LDL Cholesterol.   Chol/HDL Ratio 2.9       Suicide/Homicide Risk Assessment:    Risk of Harm to Self:  Demographic risk factors include: , male  Historical Risk Factors include: history of anxiety, history of mood  disorder  Recent Specific Risk Factors include: diagnosis of mood disorder  Protective Factors: no current suicidal ideation, being a parent, being , compliant with medications, compliant with mental health treatment, connection to own children, responsibilities and duties to others, stable living environment, stable job, supportive family  Weapons: none. The following steps have been taken to ensure weapons are properly secured: not applicable  Based on today's assessment, Zhen presents the following risk of harm to self: none    Risk of Harm to Others:  The following ratings are based on assessment at the time of the interview  Based on today's assessment, Zhen presents the following risk of harm to others: none    The following interventions are recommended: no intervention changes needed    Psychotherapy Provided:     Individual psychotherapy provided: Yes  Counseling was provided during the session today for 15 minutes.  Medications, treatment progress and treatment plan reviewed with Zhen.  Goals discussed during in session: maintain control of anxiety, maintain mood stability, and control ADHD symptoms.   Discussed with Zhen coping with medical problems, everyday stressors, and chronic mental illness.   Coping mechanisms including exercising, keeping busy at home, and keeping busy at work reviewed with Zhen.   Supportive therapy provided.      Treatment Plan:    Completed and signed during the session: Yes - Treatment Plan done but not signed at time of office visit due to:  Plan reviewed by video and verbal consent given due to virtual visit. Treatment Plan sent to patient via [employer] for signature.    Note Share:    This note was shared with patient.    Visit Time    Visit Start Time: 3:04 PM  Visit Stop Time: 3:30 PM  Total Visit Duration:  26 minutes    Gail Sanders MD 09/20/24    Virtual Regular Visit    Verification of patient location:    Patient is located at Home in the following  state in which I hold an active license [place]    Assessment/Plan:    Problem List Items Addressed This Visit          Behavioral Health    Bipolar I disorder, most recent episode depressed, in remission (HCC) - Primary (Chronic)       Orders:    lamoTRIgine (LaMICtal) 200 MG tablet; Take 2 tablets (400 mg total) by mouth daily at bedtime           Relevant Medications    Zepbound 5 MG/0.5ML auto-injector    lamoTRIgine (LaMICtal) 200 MG tablet    CODEY (generalized anxiety disorder) (Chronic)       Orders:    clonazePAM (KlonoPIN) 1 mg tablet; Take 0.5-1 tablets (0.5-1 mg total) by mouth 2 (two) times a day as needed for anxiety Do not start before October 12, 2024.           Relevant Medications    Zepbound 5 MG/0.5ML auto-injector    clonazePAM (KlonoPIN) 1 mg tablet (Start on 10/12/2024)    ADHD (attention deficit hyperactivity disorder), combined type (Chronic)                   Relevant Medications    Zepbound 5 MG/0.5ML auto-injector     Reason for visit is   Chief Complaint   Patient presents with    Medication Management    Follow-up    Virtual Regular Visit     Encounter provider Gail Sanders MD    Recent Visits  No visits were found meeting these conditions.  Showing recent visits within past 7 days and meeting all other requirements  Today's Visits  Date Type Provider Dept   09/20/24 Telemedicine Gail Sanders MD [facility]   Showing today's visits and meeting all other requirements  Future Appointments  No visits were found meeting these conditions.  Showing future appointments within next 150 days and meeting all other requirements     The patient was identified by name and date of birth. Zhen Sanabria was informed that this is a telemedicine visit and that the visit is being conducted through the Epic Embedded platform. He agrees to proceed..  My office door was closed. No one else was in the room.  He acknowledged consent and understanding of privacy and security of the  video platform. The patient has agreed to participate and understands they can discontinue the visit at any time.    Patient is aware this is a billable service.    English

## 2024-09-20 ENCOUNTER — TELEMEDICINE (OUTPATIENT)
Dept: PSYCHIATRY | Facility: CLINIC | Age: 49
End: 2024-09-20

## 2024-09-20 VITALS — HEIGHT: 73 IN | BODY MASS INDEX: 33.4 KG/M2 | WEIGHT: 252 LBS

## 2024-09-20 DIAGNOSIS — F31.76 BIPOLAR I DISORDER, MOST RECENT EPISODE DEPRESSED, IN REMISSION (HCC): Primary | Chronic | ICD-10-CM

## 2024-09-20 DIAGNOSIS — F41.1 GAD (GENERALIZED ANXIETY DISORDER): Chronic | ICD-10-CM

## 2024-09-20 DIAGNOSIS — F90.2 ADHD (ATTENTION DEFICIT HYPERACTIVITY DISORDER), COMBINED TYPE: Chronic | ICD-10-CM

## 2024-09-20 RX ORDER — LAMOTRIGINE 200 MG/1
400 TABLET ORAL
Qty: 60 TABLET | Refills: 5 | Status: SHIPPED | OUTPATIENT
Start: 2024-09-20 | End: 2025-03-19

## 2024-09-20 RX ORDER — TIRZEPATIDE 5 MG/.5ML
5 INJECTION, SOLUTION SUBCUTANEOUS WEEKLY
COMMUNITY
Start: 2024-08-28

## 2024-09-20 RX ORDER — CLONAZEPAM 1 MG/1
.5-1 TABLET ORAL 2 TIMES DAILY PRN
Qty: 60 TABLET | Refills: 4 | Status: SHIPPED | OUTPATIENT
Start: 2024-10-12 | End: 2025-03-11

## 2024-09-20 NOTE — ASSESSMENT & PLAN NOTE
Anxiety is controlled  Continue Klonopin 0.5 mg to 1 mg twice a day as needed to control anxiety symptoms  Orders:    clonazePAM (KlonoPIN) 1 mg tablet; Take 0.5-1 tablets (0.5-1 mg total) by mouth 2 (two) times a day as needed for anxiety Do not start before October 12, 2024.

## 2024-09-20 NOTE — BH CRISIS PLAN
Client Name: Zhen Sanabria       Client YOB: 1975    [facility] Safety Plan      Creation Date: 9/20/24 Update Date: 9/20/24   Created By: Gail Snaders MD       Step 1: Warning Signs:   Warning Signs   anxiety            Step 2: Internal Coping Strategies:   Internal Coping Strategies   lying down, taking naps sometimes            Step 3: People and social settings that provide distraction:   Name Contact Information   my wife Divina in my cell phone            Step 4: People whom I can ask for help during a crisis:      Name Contact Information    my wife Divina in my cell phone      Step 5: Professionals or agencies I can contact during a crisis:      Clinican/Agency Name Phone Emergency Contact    [facility] [phone]       [facility] Emergency Department Emergency Department Phone Emergency Department Address    On license of [facility] [address]         Crisis Phone Numbers:   Suicide Prevention Lifeline: Call or Text  988 Crisis Text Line: Text HOME to 879-030   Please note: Some [facility] do not have a separate number for Child/Adolescent specific crisis. If your county is not listed under Child/Adolescent, please call the adult number for your county      Adult Crisis Numbers: Child/Adolescent Crisis Numbers   [facility]: [phone] [facility]: [phone]   [facility]: [phone] [facility]: [phone]   [facility]: [phone] [place]: [phone]   [facility]: [phone] [facility]: [phone]   [facility]: [phone]   [facility]: [phone]   [facility]: [phone]   [facility]: [phone] (daytime) [phone] (after hours, weekends, holidays)      Step 6: Making the environment safer (plan for lethal means safety):   Plan: Gun - locked     Optional: What is most important to me and worth living for?   My family     [employer] Safety Plan. Rachel  Jignesh and Jayro Hampton. Used with permission of the authors.

## 2024-09-20 NOTE — BH TREATMENT PLAN
"TREATMENT PLAN (Medication Management Only)        [facility]    Name/Date of Birth/MRN:  Zhen Sanabria 49 y.o. 1975 MRN: [id number]  Date of Treatment Plan: September 20, 2024  Diagnosis/Diagnoses:   1. Bipolar I disorder, most recent episode depressed, in remission (HCC)    2. CODEY (generalized anxiety disorder)    3. ADHD (attention deficit hyperactivity disorder), combined type      Strengths/Personal Resources for Self-Care: \"creativity\".  Area/Areas of need (in own words): \"getting back in shape again\".  1. Long Term Goal:   maintain control of anxiety, maintain mood stability, control ADHD symptoms  Target Date: 4 months - 1/20/2025  Person/Persons responsible for completion of goal: Zhen  2.  Short Term Objective (s) - How will we reach this goal?:   A.  Provider new recommended medication/dosage changes and/or continue medication(s): continue current medications as prescribed (Lamictal and Klonopin).  B.  N/A.  C.  N/A.  Target Date: 4 months - 1/20/2025  Person/Persons Responsible for Completion of Goal: Zhen   Progress Towards Goals: stable  Treatment Modality: medication management every 4 months  Review due 180 days from date of this plan: 6 months - 3/20/2025  Expected length of service: maintenance unless revised  My Physician/PA/NP and I have developed this plan together and I agree to work on the goals and objectives. I understand the treatment goals that were developed for my treatment.  Electronic Signatures: on file (unless signed below)    Gail Sanders MD 09/20/24  "

## 2024-10-08 ENCOUNTER — TELEPHONE (OUTPATIENT)
Dept: PSYCHIATRY | Facility: CLINIC | Age: 49
End: 2024-10-08

## 2024-10-08 NOTE — TELEPHONE ENCOUNTER
Called and left message for patient to return a call to [phone] and schedule 4 month follow up with provider (around 1/20/2025). Please schedule upon return call. Thank you.

## 2025-03-05 DIAGNOSIS — F31.76 BIPOLAR I DISORDER, MOST RECENT EPISODE DEPRESSED, IN REMISSION (HCC): Primary | Chronic | ICD-10-CM

## 2025-03-05 RX ORDER — LAMOTRIGINE 200 MG/1
400 TABLET ORAL
Qty: 60 TABLET | Refills: 0 | OUTPATIENT
Start: 2025-03-05

## 2025-03-05 NOTE — TELEPHONE ENCOUNTER
Called and LVM to pt and asked to call office back to schedule an appt.     Please schedule when pt calls back.

## 2025-03-06 RX ORDER — LAMOTRIGINE 200 MG/1
400 TABLET ORAL
Qty: 60 TABLET | Refills: 0 | Status: SHIPPED | OUTPATIENT
Start: 2025-03-06 | End: 2025-04-05

## 2025-03-06 NOTE — TELEPHONE ENCOUNTER
Pt is looking for the refill of lamoTRIgine (LaMICtal) 200 MG tablet.  I informed him that he needs an appointment,  he stated that the office always says they call to let him know he needs appointments but he never receives the calls.   He didn't get any calls.  He would like someone to call him back to schedule the appoint as he is out of meds and needs them ASA.     [phone]

## 2025-03-06 NOTE — TELEPHONE ENCOUNTER
I escripted Lamictal for 30 days, no RF, but patient would need to schedule an appointment to receive further refills. Please make sure that appointment is scheduled.

## 2025-03-06 NOTE — TELEPHONE ENCOUNTER
Called and LVM for patient looking to schedule f/u with provider, after medication refill request.    Advised patient to call back in, to be scheduled for provider's earliest available opening. Noted a couple of current openings for early April.

## 2025-03-07 NOTE — TELEPHONE ENCOUNTER
Called and left message for patient to return a call to [phone] and schedule D follow up with provider (Dr Sanders). Please schedule upon return call. Thank you.    Please route to clinicals when appt is made.

## 2025-03-19 ENCOUNTER — TELEPHONE (OUTPATIENT)
Age: 50
End: 2025-03-19

## 2025-03-19 NOTE — TELEPHONE ENCOUNTER
Patient called in to schedule a f/u MM appt.     Patient is now scheduled for 3/28 @2pm virtually.

## 2025-03-21 NOTE — PSYCH
MEDICATION MANAGEMENT NOTE    Name: Zhen Sanabria      : 1975      MRN: [id number]  Encounter Provider: Gail Sanders MD  Encounter Date: 3/28/2025   Encounter department: [facility] [place]    Insurance: Payor: [employer] / Plan: [employer] / Product Type: PPO Commercial /      Reason for Visit:   Chief Complaint   Patient presents with    Medication Management    Follow-up    Virtual Regular Visit   :  Assessment & Plan  Bipolar I disorder, most recent episode depressed, in remission (HCC)  Mood remains stable     Continue Lamictal 400 mg at bedtime to help with mood stabilization  Orders:    lamoTRIgine (LaMICtal) 200 MG tablet; Take 2 tablets (400 mg total) by mouth daily at bedtime    CODEY (generalized anxiety disorder)  Anxiety is controlled    Continue Klonopin 0.5 mg to 1 mg twice a day as needed to control anxiety symptoms  Orders:    clonazePAM (KlonoPIN) 1 mg tablet; Take 0.5-1 tablets (0.5-1 mg total) by mouth 2 (two) times a day as needed for anxiety Do not start before 2025.    ADHD (attention deficit hyperactivity disorder), combined type  Stable         Treatment Recommendations:    Educated about diagnosis and treatment modalities. Verbalizes understanding and agreement with the treatment plan.  Discussed self monitoring of symptoms, and symptom monitoring tools.  Discussed medications and if treatment adjustment was needed or desired.  Medication management every 5 months  Follows with family physician for yearly physical exam, cardiac issues, hyperlipidemia, and hypertension  Aware of 24 hour and weekend coverage for urgent situations accessed by calling [facility] main practice number  Crisis Plan update was completed during the session and updated Crisis Plan Note was provided to the patient  I am scheduling this patient out for greater than 3 months: Yes - Patient's stability of symptoms warrant this length  "of time or no significant changes to treatment plan    Medications Risks/Benefits:      Risks, Benefits And Possible Side Effects Of Medications:    Risks, benefits, and possible side effects of medications explained to Zhen including risk of rash related to treatment with Lamictal and risks of misuse, abuse or dependence, sedation and respiratory depression related to treatment with benzodiazepine medications. He (or legal representative) verbalizes understanding and agreement for treatment.    Controlled Medication Discussion:     Zhen has been filling controlled prescriptions on time as prescribed according to [place] Prescription Drug Monitoring Program  Discussed with Zhen the risks of sedation, respiratory depression, impairment of ability to drive and potential for abuse and addiction related to treatment with benzodiazepine medications. He understands risk of treatment with benzodiazepine medications, agrees to not drive if feels impaired and agrees to take medications as prescribed      History of Present Illness     Zhen is seen today for a follow up for Bipolar Disorder, Generalized Anxiety Disorder, and ADHD. He continues to do fairly well since the last visit. He states that mood remains stable and denies any significant depressive symptoms or manic symptoms. He reports that anxiety symptoms are controlled. He is glad that he was able to find a new job \"I am starting tomorrow\" with better pay and more opportunities for growth. He has not been exercising recently, but plans to restart his exercise routine soon. He is glad that he was able to lose weight intentionally with recent treatment with Zepbound.    He denies any suicidal ideation, intent or plan at present; denies any homicidal ideation, intent or plan at present.    He has no auditory hallucinations, denies any visual hallucinations, has no delusional thoughts.    He denies any side effects from current psychiatric medications. No rash " noted or reported.    HPI ROS Appetite Changes and Sleep:     He reports normal sleep, adequate number of sleep hours (8 hours), decreased appetite, recent weight loss (32 lbs) - intentional, fluctuating energy levels    Review Of Systems: A review of systems is obtained and is negative except for the pertinent positives listed in HPI/Subjective above.      Current Rating Scores:     Current PHQ-9   PHQ-2/9 Depression Screening    Little interest or pleasure in doing things: 0 - not at all  Feeling down, depressed, or hopeless: 0 - not at all  Trouble falling or staying asleep, or sleeping too much: 1 - several days  Feeling tired or having little energy: 1 - several days  Poor appetite or overeatin - several days  Feeling bad about yourself - or that you are a failure or have let yourself or your family down: 0 - not at all  Trouble concentrating on things, such as reading the newspaper or watching television: 0 - not at all  Moving or speaking so slowly that other people could have noticed. Or the opposite - being so fidgety or restless that you have been moving around a lot more than usual: 1 - several days  Thoughts that you would be better off dead, or of hurting yourself in some way: 0 - not at all  PHQ-9 Score: 4  PHQ-9 Interpretation: No or Minimal depression       Current PHQ-9 score is slightly increased from 3 at the last visit.    Areas of Improvement: reviewed in HPI/Subjective Section and reviewed in Assessment and Plan Section    Past Psychiatric History: (unchanged information from previous note copied and updated)    Past Inpatient Psychiatric Treatment:   No history of past inpatient psychiatric admissions.  Past Outpatient Psychiatric Treatment:    In outpatient treatment at [facility] for many years.  Past Suicide Attempts: no  Past Violent Behavior: no  Past Psychiatric Medication Trials: Lamictal, Paxil, Klonopin, Strattera and Adderall XR    Traumatic History:  (unchanged information from previous note copied and updated)    Abuse: no history of sexual abuse, no history of physical abuse  Other Traumatic Events: none    Past Medical History:   Diagnosis Date    Bacterial infection of knee joint (HCC)     CAD (coronary artery disease)     Cellulitis     GERD (gastroesophageal reflux disease)     Heart attack (HCC)     Hyperlipidemia     Hypertension     Seasonal allergies         Past Surgical History:   Procedure Laterality Date    APPENDECTOMY      CORONARY ANGIOPLASTY WITH STENT PLACEMENT      KNEE ARTHROTOMY      SEPTOPLASTY      SHOULDER SURGERY       Allergies:   Allergies   Allergen Reactions    Benson (Diagnostic) - Food Allergy Other (See Comments)    Atorvastatin      Tolerates crestor    Nuts - Food Allergy Hives    [place] Nut Extract Skin Test - Food Allergy Other (See Comments)    Cefazolin Rash    Ceftriaxone Rash       Current Outpatient Medications:     [START ON 4/5/2025] clonazePAM (KlonoPIN) 1 mg tablet, Take 0.5-1 tablets (0.5-1 mg total) by mouth 2 (two) times a day as needed for anxiety Do not start before April 5, 2025., Disp: 60 tablet, Rfl: 4    Coenzyme Q10 (COQ-10) 100 MG CAPS, Take 1 capsule by mouth 2 (two) times a day, Disp: , Rfl:     EPINEPHrine (EPIPEN) 0.3 mg/0.3 mL SOAJ, USE AS DIRECTED AS NEEDED, Disp: , Rfl:     lamoTRIgine (LaMICtal) 200 MG tablet, Take 2 tablets (400 mg total) by mouth daily at bedtime, Disp: 60 tablet, Rfl: 4    lisinopril (ZESTRIL) 5 mg tablet, Take 5 mg by mouth, Disp: , Rfl:     metoprolol succinate (TOPROL-XL) 50 mg 24 hr tablet, Take 1.5 tablets by mouth daily, Disp: , Rfl:     nitroglycerin (NITROSTAT) 0.4 mg SL tablet, Place 0.4 mg under the tongue, Disp: , Rfl:     prasugrel (EFFIENT) tablet, Take 1 tablet by mouth daily, Disp: , Rfl:     rosuvastatin (CRESTOR) 20 MG tablet, Take 20 mg by mouth daily, Disp: , Rfl:     Zepbound 15 MG/0.5ML auto-injector, Inject 15 mg under the skin once a week, Disp: , Rfl:      Substance Abuse History:    Social History     Substance and Sexual Activity   Alcohol Use Yes    Comment: social alcohol use     Social History     Substance and Sexual Activity   Drug Use No    Comment: History of cocaine use years ago. No current recreational drug use       Social History:    Social History     Socioeconomic History    Marital status: /Civil Union     Spouse name: Not on file    Number of children: 1    Years of education: bachelor's degree    Highest education level: Bachelor's degree (e.g., BA, AB, BS)   Occupational History    Occupation: works in sales   Tobacco Use    Smoking status: Never    Smokeless tobacco: Never   Vaping Use    Vaping status: Never Used   Substance and Sexual Activity    Alcohol use: Yes     Comment: social alcohol use    Drug use: No     Comment: History of cocaine use years ago. No current recreational drug use    Sexual activity: Yes     Partners: Female   Other Topics Concern    Not on file   Social History Narrative    Education: bachelor's degree in engineering    Learning Disabilities: ADHD history    Marital History:     Children: 1 daughter    Living Arrangement: lives in home with wife and daughter    Occupational History: works in sales for [employer] , also worked in the past for [employer]    Functioning Relationships: good support system    Legal History: none     History: None     Social Drivers of Health     Financial Resource Strain: Low Risk  (3/28/2025)    Overall Financial Resource Strain (CARDIA)     Difficulty of Paying Living Expenses: Not hard at all   Food Insecurity: No Food Insecurity (3/28/2025)    Hunger Vital Sign     Worried About Running Out of Food in the Last Year: Never true     Ran Out of Food in the Last Year: Never true   Transportation Needs: No Transportation Needs (3/28/2025)    PRAPARE - Transportation     Lack of Transportation (Medical): No     Lack of Transportation (Non-Medical):  "No   Physical Activity: Inactive (3/28/2025)    Exercise Vital Sign     Days of Exercise per Week: 0 days     Minutes of Exercise per Session: 0 min   Stress: No Stress Concern Present (3/28/2025)    [employer] - Occupational Stress Questionnaire     Feeling of Stress : Only a little   Social Connections: Moderately Integrated (3/28/2025)    Social Connection and Isolation Panel [NHANES]     Frequency of Communication with Friends and Family: More than three times a week     Frequency of Social Gatherings with Friends and Family: More than three times a week     Attends Congregational Services: Never     Active Member of Clubs or Organizations: Yes     Attends Club or Organization Meetings: More than 4 times per year     Marital Status:    Intimate Partner Violence: Not At Risk (3/28/2025)    Humiliation, Afraid, Rape, and Kick questionnaire     Fear of Current or Ex-Partner: No     Emotionally Abused: No     Physically Abused: No     Sexually Abused: No   Housing Stability: Low Risk  (3/28/2025)    Housing Stability Vital Sign     Unable to Pay for Housing in the Last Year: No     Number of Times Moved in the Last Year: 0     Homeless in the Last Year: No       Family Psychiatric History:     Family History   Problem Relation Age of Onset    Anxiety disorder Mother     Substance Abuse Brother     Suicidality Neg Hx        Medical History Reviewed by provider this encounter:  Tobacco  Allergies  Meds  Problems  Med Hx  Surg Hx  Fam Hx  Soc   Hx         Objective   Ht 6' 1\" (1.854 m)   Wt 99.8 kg (220 lb)   BMI 29.03 kg/m²      Mental Status Evaluation:    Appearance age appropriate, casually dressed   Behavior cooperative, calm   Speech normal rate, normal volume, normal pitch, spontaneous   Mood euthymic   Affect normal range and intensity, appropriate   Thought Processes organized, goal directed   Thought Content no overt delusions   Perceptual Disturbances: no auditory " hallucinations, no visual hallucinations   Abnormal Thoughts  Risk Potential Suicidal ideation - None  Homicidal ideation - None  Potential for aggression - No   Orientation oriented to person, place, time/date, and situation   Memory recent and remote memory grossly intact   Consciousness alert and awake   Attention Span Concentration Span attention span and concentration are age appropriate   Intellect appears to be of average intelligence   Insight intact   Judgement intact   Muscle Strength and  Gait normal muscle strength and normal muscle tone, normal gait and normal balance   Motor activity no abnormal movements   Language no difficulty naming common objects, no difficulty repeating a phrase, no difficulty writing a sentence   Fund of Knowledge adequate knowledge of current events  adequate fund of knowledge regarding past history  adequate fund of knowledge regarding vocabulary    Pain none   Pain Scale 0       Laboratory Results: I have personally reviewed all pertinent laboratory/tests results    CBC AND DIFFERENTIAL  Order: [id number]  Component  Ref Range & Units 10/9/24  9:32 AM   Hemoglobin  12.5 - 17.0 g/dL 16.2   Hematocrit  37.0 - 48.0 % 47.2   WBC  4.0 - 10.5 thou/cmm 9.3   RBC  4.00 - 5.40 mill/cmm 5.29   Platelet  140 - 350 thou/cmm 257   MPV  7.5 - 11.3 fL 7.8   MCV  80 - 100 fL 89   MCH  27.0 - 36.0 pg 30.6   MCHC  32.0 - 37.0 g/dL 34.3   RDW  12.0 - 16.0 % 13.3   Differential Type AUTO   Absolute Neutrophils  1.8 - 7.8 thou/cmm 5   Absolute Lymphocytes  1.0 - 3.0 thou/cmm 3.2 High    Absolute Monocytes  0.3 - 1.0 thou/cmm 0.7   Absolute Eosinophils  0.0 - 0.5 thou/cmm 0.3   Absolute Basophils  0.0 - 0.1 thou/cmm 0   Neutrophils  % 54   Lymphocytes Manual  % 35   Monocytes  % 8   Eosinophils  % 3   Basophils  % 0   CMP:   Lab Results   Component Value Date    SODIUM 140 10/09/2024    K 4.5 10/09/2024     10/09/2024    CO2 27 10/09/2024    AGAP 9 10/09/2024    BUN 15 10/09/2024     CREATININE 1.22 10/09/2024    GLUC 78 10/09/2024    CALCIUM 9.8 10/09/2024    AST 21 10/09/2024    ALT 19 10/09/2024    ALKPHOS 61 10/09/2024    TP 7.5 10/09/2024    ALB 4.5 10/09/2024    TBILI 0.4 10/09/2024    EGFR 72 10/09/2024   Hemoglobin A1C:   Lab Results   Component Value Date    HGBA1C 5.3 10/09/2024     10/09/2024   LIPID PANEL  Order: [id number]  Component  Ref Range & Units 10/9/24  9:32 AM   Cholesterol  <200 mg/dL 121   Triglycerides  <150 mg/dL 192 High    Cholesterol, HDL, Direct  23 - 92 mg/dL 46   Cholesterol, Non-HDL  <160 mg/dL 75   LDL Cholesterol  <130 mg/dL 37   Comment: LDL Cholesterol was calculated using the Friedewald equation. Direct measurement of LDL is not indicated for this patient based on [facility]'s analytical algorithm for measurement of LDL Cholesterol.   Chol/HDL Ratio 2.6       Suicide/Homicide Risk Assessment:    Risk of Harm to Self:  Demographic Risk Factors include: , male  Historical Risk Factors include: history of anxiety, history of mood disorder  Current Specific Risk Factors include: diagnosis of mood disorder  Protective Factors: no current suicidal ideation, being a parent, being , compliant with medications, compliant with mental health treatment, connection to own children, responsibilities and duties to others, stable living environment, supportive family  Weapons/Firearms: none. The following steps have been taken to ensure weapons are properly secured: not applicable  Based on today's assessment, Zhen presents the following risk of harm to self: none    Risk of Harm to Others:  The following ratings are based on assessment at the time of the interview  Based on today's assessmentZhen presents the following risk of harm to others: none    The following interventions are recommended: Continue medication management. No other intervention changes indicated at this time.    Psychotherapy Provided:     Individual psychotherapy provided: Yes     Counseling was provided during the session today for 16 minutes.  Medications, treatment progress and treatment plan reviewed with Zhen.  Goals discussed during in session: maintain control of anxiety, maintain control of depression, maintain mood stability, and control ADHD symptoms.  Discussed with Zhen coping with occasional anxiety, chronic mental illness, and recent job change .  Coping techniques including spending time with family and restarting exercise routine  reviewed with Zhen.   Supportive therapy provided.     Treatment Plan:    Completed and signed during the session: Yes - Treatment Plan done but not signed at time of office visit due to:  Plan reviewed by video and verbal consent given due to virtual visit. Treatment Plan sent to patient via [employer] for signature.    Goals: Progress towards Treatment Plan goals - Yes, progressing, as evidenced by subjective findings in HPI/Subjective Section and in Assessment and Plan Section    Depression Follow-up Plan Completed: No    Note Share:    This note was shared with patient.    Administrative Statements   Administrative Statements   Encounter provider Gail Sanders MD    The Patient is located at Home and in the following state in which I hold an active license [place].    The patient was identified by name and date of birth. Zhen Sanabria was informed that this is a telemedicine visit and that the visit is being conducted through the Epic Embedded platform. He agrees to proceed..  My office door was closed. No one else was in the room.  He acknowledged consent and understanding of privacy and security of the video platform. The patient has agreed to participate and understands they can discontinue the visit at any time.    I have spent a total time of 24 minutes in caring for this patient on the day of the visit/encounter including Counseling / Coordination of care, not including the time spent for establishing the audio/video  connection.    Visit Time  Visit Start Time: 2:01 PM  Visit Stop Time: 2:25 PM  Total Visit Duration:  24 minutes    Gail Sanders MD 03/28/25

## 2025-03-28 ENCOUNTER — TELEMEDICINE (OUTPATIENT)
Dept: PSYCHIATRY | Facility: CLINIC | Age: 50
End: 2025-03-28
Payer: COMMERCIAL

## 2025-03-28 VITALS — HEIGHT: 73 IN | BODY MASS INDEX: 29.16 KG/M2 | WEIGHT: 220 LBS

## 2025-03-28 DIAGNOSIS — F90.2 ADHD (ATTENTION DEFICIT HYPERACTIVITY DISORDER), COMBINED TYPE: Chronic | ICD-10-CM

## 2025-03-28 DIAGNOSIS — F31.76 BIPOLAR I DISORDER, MOST RECENT EPISODE DEPRESSED, IN REMISSION (HCC): Primary | Chronic | ICD-10-CM

## 2025-03-28 DIAGNOSIS — F41.1 GAD (GENERALIZED ANXIETY DISORDER): Chronic | ICD-10-CM

## 2025-03-28 PROCEDURE — 99214 OFFICE O/P EST MOD 30 MIN: CPT | Performed by: PSYCHIATRY & NEUROLOGY

## 2025-03-28 PROCEDURE — 90833 PSYTX W PT W E/M 30 MIN: CPT | Performed by: PSYCHIATRY & NEUROLOGY

## 2025-03-28 RX ORDER — ROSUVASTATIN CALCIUM 20 MG/1
20 TABLET, COATED ORAL DAILY
COMMUNITY
Start: 2025-03-02

## 2025-03-28 RX ORDER — TIRZEPATIDE 15 MG/.5ML
15 INJECTION, SOLUTION SUBCUTANEOUS WEEKLY
COMMUNITY
Start: 2025-03-20

## 2025-03-28 RX ORDER — CLONAZEPAM 1 MG/1
.5-1 TABLET ORAL 2 TIMES DAILY PRN
Qty: 60 TABLET | Refills: 4 | Status: SHIPPED | OUTPATIENT
Start: 2025-04-05 | End: 2025-09-02

## 2025-03-28 RX ORDER — LAMOTRIGINE 200 MG/1
400 TABLET ORAL
Qty: 60 TABLET | Refills: 4 | Status: SHIPPED | OUTPATIENT
Start: 2025-03-28 | End: 2025-08-25

## 2025-03-28 NOTE — ASSESSMENT & PLAN NOTE
Mood remains stable     Continue Lamictal 400 mg at bedtime to help with mood stabilization  Orders:    lamoTRIgine (LaMICtal) 200 MG tablet; Take 2 tablets (400 mg total) by mouth daily at bedtime

## 2025-03-28 NOTE — BH TREATMENT PLAN
"TREATMENT PLAN (Medication Management Only)        [facility]    Name/Date of Birth/MRN:  Zhen Sanabria 49 y.o. 1975 MRN: [id number]  Date of Treatment Plan: March 28, 2025  Diagnosis/Diagnoses:   1. Bipolar I disorder, most recent episode depressed, in remission (HCC)    2. CODEY (generalized anxiety disorder)    3. ADHD (attention deficit hyperactivity disorder), combined type      Strengths/Personal Resources for Self-Care: \"creativity\".  Area/Areas of need (in own words): \"patience\".  1. Long Term Goal:   maintain control of anxiety, maintain control of depression, maintain mood stability, control ADHD symptoms  Target Date: 5 months - 8/28/2025  Person/Persons responsible for completion of goal: Zhen  2.  Short Term Objective (s) - How will we reach this goal?:   A.  Provider new recommended medication/dosage changes and/or continue medication(s): continue current medications as prescribed (Lamictal and Klonopin).  B.  N/A.  C.  N/A.  Target Date: 5 months - 8/28/2025  Person/Persons Responsible for Completion of Goal: Zhen   Progress Towards Goals: stable  Treatment Modality: medication management every 5 months  Review due 180 days from date of this plan: 6 months - 9/28/2025  Expected length of service: maintenance unless revised  My Physician/PA/NP and I have developed this plan together and I agree to work on the goals and objectives. I understand the treatment goals that were developed for my treatment.  Electronic Signatures: on file (unless signed below)    Gail Sanders MD 03/28/25  "

## 2025-03-28 NOTE — ASSESSMENT & PLAN NOTE
Anxiety is controlled    Continue Klonopin 0.5 mg to 1 mg twice a day as needed to control anxiety symptoms  Orders:    clonazePAM (KlonoPIN) 1 mg tablet; Take 0.5-1 tablets (0.5-1 mg total) by mouth 2 (two) times a day as needed for anxiety Do not start before April 5, 2025.

## 2025-03-28 NOTE — BH CRISIS PLAN
Client Name: Zhen Sanabria       Client YOB: 1975    [employer] Safety Plan      Creation Date: 9/20/24 Update Date: 3/28/25   Created By: Gail Sanders MD       Step 1: Warning Signs:   Warning Signs   anxiety            Step 2: Internal Coping Strategies:   Internal Coping Strategies   lying down, taking naps sometimes            Step 3: People and social settings that provide distraction:   Name Contact Information   my wife Divina in my cell phone            Step 4: People whom I can ask for help during a crisis:      Name Contact Information    my wife Divina in my cell phone      Step 5: Professionals or agencies I can contact during a crisis:      Clinican/Agency Name Phone Emergency Contact    [facility] [phone]       [facility] Emergency Department Emergency Department Phone Emergency Department Address    Critical access hospital 911         Crisis Phone Numbers:   Suicide Prevention Lifeline: Call or Text  988 Crisis Text Line: Text HOME to 750-125   Please note: Some [facility] do not have a separate number for Child/Adolescent specific crisis. If your county is not listed under Child/Adolescent, please call the adult number for your county      Adult Crisis Numbers: Child/Adolescent Crisis Numbers   [facility]: [phone] [facility]: [phone]   [facility]: [phone] [facility]: [phone]   [facility]: [phone] [place]: [phone]   [facility]: [phone] [facility]: [phone]   [facility]: [phone]   [facility]: [phone]   [facility]: [phone]   [facility]: [phone] (daytime) [phone] (after hours, weekends, holidays)      Step 6: Making the environment safer (plan for lethal means safety):   Plan: Gun - locked     Optional: What is most important to me and worth living for?   My family     Jignesh[employer]erson Safety Plan. Rachel  Jignesh and Jayro Hampton. Used with permission of the authors.

## 2025-03-31 ENCOUNTER — TELEPHONE (OUTPATIENT)
Dept: PSYCHIATRY | Facility: CLINIC | Age: 50
End: 2025-03-31

## 2025-03-31 NOTE — TELEPHONE ENCOUNTER
Called and left message for patient to return a call to [phone] and schedule 5 month follow  ( 5 months (around 8/28/2025) up with provider (Dr. Sanders ). Please schedule upon return call. Thank you.

## 2025-06-09 DIAGNOSIS — F41.1 GAD (GENERALIZED ANXIETY DISORDER): Chronic | ICD-10-CM

## 2025-06-09 NOTE — TELEPHONE ENCOUNTER
Forwarding to primary provider for review upon return. Medication is PRN - last filled on 3/6. Request was generated through [employer].

## 2025-06-10 RX ORDER — CLONAZEPAM 1 MG/1
TABLET ORAL
Qty: 60 TABLET | OUTPATIENT
Start: 2025-06-10

## 2025-06-11 ENCOUNTER — TELEPHONE (OUTPATIENT)
Age: 50
End: 2025-06-11

## 2025-08-02 DIAGNOSIS — F31.76 BIPOLAR I DISORDER, MOST RECENT EPISODE DEPRESSED, IN REMISSION (HCC): Chronic | ICD-10-CM

## 2025-08-04 RX ORDER — LAMOTRIGINE 200 MG/1
400 TABLET ORAL
Qty: 60 TABLET | Refills: 2 | Status: SHIPPED | OUTPATIENT
Start: 2025-08-04